# Patient Record
Sex: FEMALE | Race: WHITE | NOT HISPANIC OR LATINO | Employment: FULL TIME | URBAN - METROPOLITAN AREA
[De-identification: names, ages, dates, MRNs, and addresses within clinical notes are randomized per-mention and may not be internally consistent; named-entity substitution may affect disease eponyms.]

---

## 2018-09-06 ENCOUNTER — OFFICE VISIT (OUTPATIENT)
Dept: INTERNAL MEDICINE | Facility: CLINIC | Age: 43
End: 2018-09-06

## 2018-09-06 VITALS
SYSTOLIC BLOOD PRESSURE: 112 MMHG | HEIGHT: 64 IN | OXYGEN SATURATION: 98 % | WEIGHT: 154.9 LBS | HEART RATE: 73 BPM | DIASTOLIC BLOOD PRESSURE: 74 MMHG | TEMPERATURE: 98.5 F | BODY MASS INDEX: 26.44 KG/M2

## 2018-09-06 DIAGNOSIS — L71.9 ROSACEA: Primary | ICD-10-CM

## 2018-09-06 DIAGNOSIS — J45.20 MILD INTERMITTENT ASTHMA, UNSPECIFIED WHETHER COMPLICATED: ICD-10-CM

## 2018-09-06 DIAGNOSIS — Z00.00 HEALTHCARE MAINTENANCE: ICD-10-CM

## 2018-09-06 PROBLEM — J45.909 ASTHMA: Status: ACTIVE | Noted: 2018-09-06

## 2018-09-06 PROCEDURE — 99386 PREV VISIT NEW AGE 40-64: CPT | Performed by: FAMILY MEDICINE

## 2018-09-06 PROCEDURE — 99213 OFFICE O/P EST LOW 20 MIN: CPT | Performed by: FAMILY MEDICINE

## 2018-09-06 PROCEDURE — 90715 TDAP VACCINE 7 YRS/> IM: CPT | Performed by: FAMILY MEDICINE

## 2018-09-06 PROCEDURE — 90471 IMMUNIZATION ADMIN: CPT | Performed by: FAMILY MEDICINE

## 2018-09-06 RX ORDER — ALBUTEROL SULFATE 90 UG/1
2 AEROSOL, METERED RESPIRATORY (INHALATION) EVERY 4 HOURS PRN
Qty: 1 INHALER | Refills: 2 | Status: SHIPPED | OUTPATIENT
Start: 2018-09-06

## 2018-09-06 RX ORDER — METRONIDAZOLE 10 MG/G
GEL TOPICAL DAILY
Qty: 55 G | Refills: 2 | Status: SHIPPED | OUTPATIENT
Start: 2018-09-06 | End: 2022-09-02 | Stop reason: SDUPTHER

## 2018-09-06 NOTE — PROGRESS NOTES
Krupa Palomo is a 43 y.o. female.      Assessment/Plan   Problem List Items Addressed This Visit        Respiratory    Asthma    Relevant Medications    albuterol (PROVENTIL HFA;VENTOLIN HFA) 108 (90 Base) MCG/ACT inhaler       Musculoskeletal and Integument    Rosacea - Primary       Other    Healthcare maintenance    Relevant Orders    TSH (Completed)    T4, Free (Completed)    Comprehensive Metabolic Panel (Completed)    CBC & Differential (Completed)           follow up RESULTS of blood work for ongoing management of chronic medical problems otherwise as needed 6 months      Chief Complaint   Patient presents with   • new patient visit   • would like to have her thyroid level checked     family history - unable to lose weight   • Rosacea     Social History   Substance Use Topics   • Smoking status: Never Smoker   • Smokeless tobacco: Never Used   • Alcohol use No       History of Present Illness   Patient new to this office with chronic problems of rosacea and asthma doing fairly well post treatment plan no chest pain shortness of breath and increased fatigue should like refill on her medication  Krupa Palomo 43 y.o. female who presents for an Annual Wellness Visit.  she has a history of   Patient Active Problem List   Diagnosis   • Rosacea   • Asthma   • Healthcare maintenance   .  she has been feeling well.  Labs results discussed in detail with the patient.  Plan to update vaccines if needed today.  I  reviewed health maintenance with her as part of my preventative care plan.    Health Habits:  Dental Exam. up to date  Eye Exam. up to date  Exercise: 4 times/week.  Current exercise activities include: walking        The following portions of the patient's history were reviewed and updated as appropriate:PMHroutine: Social history , Past Medical History, Allergies, Current Medications, Active Problem List, Family History and Health Maintenance    Review of Systems   Constitutional: Negative for activity  "change, appetite change and unexpected weight change.   HENT: Negative for nosebleeds and trouble swallowing.    Eyes: Negative for pain and visual disturbance.   Respiratory: Negative for chest tightness, shortness of breath and wheezing.    Cardiovascular: Negative for chest pain and palpitations.   Gastrointestinal: Negative for abdominal pain and blood in stool.   Endocrine: Negative.    Genitourinary: Negative for difficulty urinating and hematuria.   Musculoskeletal: Negative for joint swelling.   Skin: Negative for color change and rash.   Allergic/Immunologic: Negative.    Neurological: Negative for syncope and speech difficulty.   Hematological: Negative for adenopathy.   Psychiatric/Behavioral: Negative for agitation and confusion.   All other systems reviewed and are negative.      Objective   Vitals:    09/06/18 1438   BP: 112/74   BP Location: Left arm   Patient Position: Sitting   Cuff Size: Adult   Pulse: 73   Temp: 98.5 °F (36.9 °C)   TempSrc: Oral   SpO2: 98%   Weight: 70.3 kg (154 lb 14.4 oz)   Height: 162.6 cm (64\")     Body mass index is 26.59 kg/m².  Physical Exam   Constitutional: She is oriented to person, place, and time. She appears well-developed and well-nourished. No distress.   HENT:   Head: Normocephalic and atraumatic.   Right Ear: External ear normal.   Left Ear: External ear normal.   Mouth/Throat: Oropharynx is clear and moist.   Eyes: Pupils are equal, round, and reactive to light. Conjunctivae and EOM are normal. Right eye exhibits no discharge. Left eye exhibits no discharge. No scleral icterus.   Neck: Normal range of motion. Neck supple. No tracheal deviation present. No thyromegaly present.   Cardiovascular: Normal rate, regular rhythm, normal heart sounds, intact distal pulses and normal pulses.  Exam reveals no gallop.    No murmur heard.  Pulmonary/Chest: Effort normal and breath sounds normal. No respiratory distress. She has no wheezes. She has no rales. "   Musculoskeletal: Normal range of motion.   Neurological: She is alert and oriented to person, place, and time. She exhibits normal muscle tone. Coordination normal.   Skin: Skin is warm. No rash noted. There is erythema. No pallor.   Psychiatric: She has a normal mood and affect. Her behavior is normal. Judgment and thought content normal.   Nursing note and vitals reviewed.    Reviewed Data:  Office Visit on 09/06/2018   Component Date Value Ref Range Status   • TSH 09/06/2018 1.580  0.270 - 4.200 mIU/mL Final   • Free T4 09/06/2018 1.12  0.93 - 1.70 ng/dL Final   • Glucose 09/06/2018 89  65 - 99 mg/dL Final   • BUN 09/06/2018 11  6 - 20 mg/dL Final   • Creatinine 09/06/2018 0.96  0.57 - 1.00 mg/dL Final   • eGFR Non  Am 09/06/2018 64  >60 mL/min/1.73 Final   • eGFR African Am 09/06/2018 78  >60 mL/min/1.73 Final   • BUN/Creatinine Ratio 09/06/2018 11.5  7.0 - 25.0 Final   • Sodium 09/06/2018 141  136 - 145 mmol/L Final   • Potassium 09/06/2018 4.2  3.5 - 5.2 mmol/L Final   • Chloride 09/06/2018 105  98 - 107 mmol/L Final   • Total CO2 09/06/2018 23.6  22.0 - 29.0 mmol/L Final   • Calcium 09/06/2018 8.9  8.6 - 10.5 mg/dL Final   • Total Protein 09/06/2018 7.7  6.0 - 8.5 g/dL Final   • Albumin 09/06/2018 4.90  3.50 - 5.20 g/dL Final   • Globulin 09/06/2018 2.8  gm/dL Final   • A/G Ratio 09/06/2018 1.8  g/dL Final   • Total Bilirubin 09/06/2018 0.6  0.1 - 1.2 mg/dL Final   • Alkaline Phosphatase 09/06/2018 56  39 - 117 U/L Final   • AST (SGOT) 09/06/2018 17  1 - 32 U/L Final   • ALT (SGPT) 09/06/2018 12  1 - 33 U/L Final   • WBC 09/06/2018 7.07  4.50 - 10.70 10*3/mm3 Final   • RBC 09/06/2018 4.42  3.90 - 5.20 10*6/mm3 Final   • Hemoglobin 09/06/2018 13.4  11.9 - 15.5 g/dL Final   • Hematocrit 09/06/2018 40.4  35.6 - 45.5 % Final   • MCV 09/06/2018 91.4  80.5 - 98.2 fL Final   • MCH 09/06/2018 30.3  26.9 - 32.0 pg Final   • MCHC 09/06/2018 33.2  32.4 - 36.3 g/dL Final   • RDW 09/06/2018 12.8  11.7 - 13.0 %  Final   • Platelets 09/06/2018 186  140 - 500 10*3/mm3 Final   • Neutrophil Rel % 09/06/2018 65.5  42.7 - 76.0 % Final   • Lymphocyte Rel % 09/06/2018 22.9  19.6 - 45.3 % Final   • Monocyte Rel % 09/06/2018 9.6  5.0 - 12.0 % Final   • Eosinophil Rel % 09/06/2018 1.7  0.3 - 6.2 % Final   • Basophil Rel % 09/06/2018 0.3  0.0 - 1.5 % Final   • Neutrophils Absolute 09/06/2018 4.63  1.90 - 8.10 10*3/mm3 Final   • Lymphocytes Absolute 09/06/2018 1.62  0.90 - 4.80 10*3/mm3 Final   • Monocytes Absolute 09/06/2018 0.68  0.20 - 1.20 10*3/mm3 Final   • Eosinophils Absolute 09/06/2018 0.12  0.00 - 0.70 10*3/mm3 Final   • Basophils Absolute 09/06/2018 0.02  0.00 - 0.20 10*3/mm3 Final   • Immature Granulocyte Rel % 09/06/2018 0.1  0.0 - 0.5 % Final   • Immature Grans Absolute 09/06/2018 0.01  0.00 - 0.03 10*3/mm3 Final

## 2018-09-07 ENCOUNTER — TELEPHONE (OUTPATIENT)
Dept: FAMILY MEDICINE CLINIC | Facility: CLINIC | Age: 43
End: 2018-09-07

## 2018-09-07 LAB
ALBUMIN SERPL-MCNC: 4.9 G/DL (ref 3.5–5.2)
ALBUMIN/GLOB SERPL: 1.8 G/DL
ALP SERPL-CCNC: 56 U/L (ref 39–117)
ALT SERPL-CCNC: 12 U/L (ref 1–33)
AST SERPL-CCNC: 17 U/L (ref 1–32)
BASOPHILS # BLD AUTO: 0.02 10*3/MM3 (ref 0–0.2)
BASOPHILS NFR BLD AUTO: 0.3 % (ref 0–1.5)
BILIRUB SERPL-MCNC: 0.6 MG/DL (ref 0.1–1.2)
BUN SERPL-MCNC: 11 MG/DL (ref 6–20)
BUN/CREAT SERPL: 11.5 (ref 7–25)
CALCIUM SERPL-MCNC: 8.9 MG/DL (ref 8.6–10.5)
CHLORIDE SERPL-SCNC: 105 MMOL/L (ref 98–107)
CO2 SERPL-SCNC: 23.6 MMOL/L (ref 22–29)
CREAT SERPL-MCNC: 0.96 MG/DL (ref 0.57–1)
EOSINOPHIL # BLD AUTO: 0.12 10*3/MM3 (ref 0–0.7)
EOSINOPHIL NFR BLD AUTO: 1.7 % (ref 0.3–6.2)
ERYTHROCYTE [DISTWIDTH] IN BLOOD BY AUTOMATED COUNT: 12.8 % (ref 11.7–13)
GLOBULIN SER CALC-MCNC: 2.8 GM/DL
GLUCOSE SERPL-MCNC: 89 MG/DL (ref 65–99)
HCT VFR BLD AUTO: 40.4 % (ref 35.6–45.5)
HGB BLD-MCNC: 13.4 G/DL (ref 11.9–15.5)
IMM GRANULOCYTES # BLD: 0.01 10*3/MM3 (ref 0–0.03)
IMM GRANULOCYTES NFR BLD: 0.1 % (ref 0–0.5)
LYMPHOCYTES # BLD AUTO: 1.62 10*3/MM3 (ref 0.9–4.8)
LYMPHOCYTES NFR BLD AUTO: 22.9 % (ref 19.6–45.3)
MCH RBC QN AUTO: 30.3 PG (ref 26.9–32)
MCHC RBC AUTO-ENTMCNC: 33.2 G/DL (ref 32.4–36.3)
MCV RBC AUTO: 91.4 FL (ref 80.5–98.2)
MONOCYTES # BLD AUTO: 0.68 10*3/MM3 (ref 0.2–1.2)
MONOCYTES NFR BLD AUTO: 9.6 % (ref 5–12)
NEUTROPHILS # BLD AUTO: 4.63 10*3/MM3 (ref 1.9–8.1)
NEUTROPHILS NFR BLD AUTO: 65.5 % (ref 42.7–76)
PLATELET # BLD AUTO: 186 10*3/MM3 (ref 140–500)
POTASSIUM SERPL-SCNC: 4.2 MMOL/L (ref 3.5–5.2)
PROT SERPL-MCNC: 7.7 G/DL (ref 6–8.5)
RBC # BLD AUTO: 4.42 10*6/MM3 (ref 3.9–5.2)
SODIUM SERPL-SCNC: 141 MMOL/L (ref 136–145)
T4 FREE SERPL-MCNC: 1.12 NG/DL (ref 0.93–1.7)
TSH SERPL DL<=0.005 MIU/L-ACNC: 1.58 MIU/ML (ref 0.27–4.2)
WBC # BLD AUTO: 7.07 10*3/MM3 (ref 4.5–10.7)

## 2022-01-22 PROBLEM — R05.3 POST-COVID CHRONIC COUGH: Status: ACTIVE | Noted: 2022-01-22

## 2022-01-22 PROBLEM — U09.9 POST-COVID CHRONIC COUGH: Status: ACTIVE | Noted: 2022-01-22

## 2022-09-02 ENCOUNTER — OFFICE VISIT (OUTPATIENT)
Dept: INTERNAL MEDICINE | Facility: CLINIC | Age: 47
End: 2022-09-02

## 2022-09-02 VITALS
HEART RATE: 74 BPM | HEIGHT: 55 IN | TEMPERATURE: 97.8 F | BODY MASS INDEX: 36.33 KG/M2 | DIASTOLIC BLOOD PRESSURE: 82 MMHG | WEIGHT: 157 LBS | SYSTOLIC BLOOD PRESSURE: 122 MMHG | OXYGEN SATURATION: 99 %

## 2022-09-02 DIAGNOSIS — J45.20 MILD INTERMITTENT ASTHMA, UNSPECIFIED WHETHER COMPLICATED: Primary | ICD-10-CM

## 2022-09-02 DIAGNOSIS — J04.0 LARYNGITIS: ICD-10-CM

## 2022-09-02 PROCEDURE — 99213 OFFICE O/P EST LOW 20 MIN: CPT | Performed by: NURSE PRACTITIONER

## 2022-09-02 RX ORDER — FLUOXETINE 10 MG/1
10 CAPSULE ORAL DAILY
COMMUNITY
Start: 2022-08-09 | End: 2023-01-03

## 2022-09-02 RX ORDER — PREDNISONE 10 MG/1
30 TABLET ORAL DAILY
Qty: 15 TABLET | Refills: 0 | Status: SHIPPED | OUTPATIENT
Start: 2022-09-02 | End: 2022-09-07

## 2022-09-02 NOTE — PROGRESS NOTES
"Chief Complaint  Laryngitis (Sx started 3 wks ago )    Subjective        Krupa Palomo presents to [facility]  History of Present Illness    Hx of Chronic laryngitis. This episode has been for 3 weeks. 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 She has no SOB/trouble swallowing/choking episodes. Hx of Asthma.   Covid in 01/2022. Denies any other URI symptoms.     Objective   Vital Signs:  /82   Pulse 74   Temp 97.8 °F (36.6 °C)   Ht 97.8 cm (38.5\")   Wt 71.2 kg (157 lb)   SpO2 99%   BMI 74.45 kg/m²   Estimated body mass index is 74.45 kg/m² as calculated from the following:    Height as of this encounter: 97.8 cm (38.5\").    Weight as of this encounter: 71.2 kg (157 lb).          Physical Exam  Vitals and nursing note reviewed.   Constitutional:       Appearance: Normal appearance.   HENT:      Head: Normocephalic.      Nose: Nose normal.      Mouth/Throat:      Mouth: Mucous membranes are moist.      Pharynx: No oropharyngeal exudate or posterior oropharyngeal erythema.      Comments: Lost voice x 3 weeks.   Eyes:      Pupils: Pupils are equal, round, and reactive to light.   Cardiovascular:      Rate and Rhythm: Normal rate and regular rhythm.      Pulses: Normal pulses.      Heart sounds: Normal heart sounds.      Comments: No peripheral edema noted.   Pulmonary:      Effort: Pulmonary effort is normal. No respiratory distress.      Breath sounds: Normal breath sounds. No stridor. No wheezing, rhonchi or rales.   Chest:      Chest wall: No tenderness.   Musculoskeletal:         General: Normal range of motion.   Skin:     General: Skin is warm.      Capillary Refill: Capillary refill takes less than 2 seconds.   Neurological:      Mental Status: She is alert and oriented to person, place, and time.   Psychiatric:         Behavior: Behavior normal.        Result Review :{Labs  Result Review  Imaging  Med Tab  Media  Procedures  :23}                  Assessment and " Plan   Diagnoses and all orders for this visit:    1. Mild intermittent asthma, unspecified whether complicated (Primary)  -     Ambulatory Referral to ENT (Otolaryngology)    2. Laryngitis  -     Ambulatory Referral to ENT (Otolaryngology)    Other orders  -     predniSONE (DELTASONE) 10 MG tablet; Take 3 tablets by mouth Daily for 5 days.  Dispense: 15 tablet; Refill: 0      Patient will stop at pharmacy and take her prednisone as directed.   If she develops any SOB or difficulty swallowing she will go to the ER.   Mucinex as needed for any congestion.   Referral placed to ENT.          Follow Up   No follow-ups on file.  Patient was given instructions and counseling regarding her condition or for health maintenance advice. Please see specific information pulled into the AVS if appropriate.

## 2023-01-03 ENCOUNTER — OFFICE VISIT (OUTPATIENT)
Dept: INTERNAL MEDICINE | Facility: CLINIC | Age: 48
End: 2023-01-03
Payer: COMMERCIAL

## 2023-01-03 ENCOUNTER — PATIENT ROUNDING (BHMG ONLY) (OUTPATIENT)
Dept: INTERNAL MEDICINE | Facility: CLINIC | Age: 48
End: 2023-01-03
Payer: COMMERCIAL

## 2023-01-03 VITALS
TEMPERATURE: 98 F | SYSTOLIC BLOOD PRESSURE: 110 MMHG | HEART RATE: 76 BPM | WEIGHT: 159.5 LBS | HEIGHT: 55 IN | BODY MASS INDEX: 36.91 KG/M2 | OXYGEN SATURATION: 100 % | DIASTOLIC BLOOD PRESSURE: 70 MMHG

## 2023-01-03 DIAGNOSIS — Z00.00 HEALTH CARE MAINTENANCE: Primary | ICD-10-CM

## 2023-01-03 DIAGNOSIS — Z21 HIV ANTIBODY POSITIVE: ICD-10-CM

## 2023-01-03 DIAGNOSIS — Z12.11 COLON CANCER SCREENING: ICD-10-CM

## 2023-01-03 DIAGNOSIS — J45.20 MILD INTERMITTENT ASTHMA, UNSPECIFIED WHETHER COMPLICATED: ICD-10-CM

## 2023-01-03 PROBLEM — L30.0 NUMMULAR ECZEMA: Status: ACTIVE | Noted: 2023-01-03

## 2023-01-03 PROCEDURE — 99396 PREV VISIT EST AGE 40-64: CPT | Performed by: NURSE PRACTITIONER

## 2023-01-03 NOTE — PROGRESS NOTES
Subjective   Krupa Palomo is a 47 y.o. female.     History of Present Illness   The patient is here today for CPE and lab work F/U. She is feeling well overall. Last CPE several years ago. She had a CPE at the [facility].   She exercises 5-6 days a week, running. She eats pretty healthy.     Heart murmur- dx in early 20s, tricuspid valve regurg, no issues, had an ECHO  Asthma- uses rescue maybe once a month  Eczema- UTD with derm, will sometimes use triamcinolone and eucrisa.     G-2 P-2   Pre-eclampsia with first pregnancy.     This summer donated blood and got a letter that showed antibodies for HIV. A further screen showed she did not have HIV. She has only had one sexual partner, has never used IV drugs. She has not had any further work up on this.     Had COVID Jan 2022.     C/o irritability with 2nd half of her cycle. She did try the prozac with out much relief. She is limiting caffeine that part of the month. She did start taking provitalize about 3 weeks ago. Probiotics.     , dtrs: 18 and 13, 2 foster dtrs, 17 and 13. They are planning on adopting.   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   The following portions of the patient's history were reviewed and updated as appropriate: allergies, current medications, past family history, past medical history, past social history, past surgical history and problem list.    Review of Systems   Constitutional: Negative for chills, fatigue and fever.   HENT: Negative for ear pain, rhinorrhea and sore throat.    Eyes: Negative.    Respiratory: Negative for cough and shortness of breath.    Cardiovascular: Negative.    Gastrointestinal: Negative.    Endocrine: Negative for cold intolerance and heat intolerance.   Genitourinary: Negative for breast discharge, breast lump, breast pain, difficulty urinating, dyspareunia, dysuria, flank pain, frequency, genital sores, hematuria, pelvic pain and urgency.   Musculoskeletal: Negative.    Skin: Positive for dry skin (eczema,  ankles and elbows).   Allergic/Immunologic: Negative for environmental allergies and food allergies.   Neurological: Negative.    Hematological: Negative.    Psychiatric/Behavioral: Negative for dysphoric mood and suicidal ideas. The patient is not nervous/anxious.        Objective   Physical Exam  Constitutional:       Appearance: Normal appearance. She is well-developed.      Comments: Body mass index is 75.64 kg/m².     HENT:      Right Ear: Hearing, tympanic membrane, ear canal and external ear normal.      Left Ear: Hearing, tympanic membrane, ear canal and external ear normal.   Eyes:      General: Lids are normal.      Conjunctiva/sclera: Conjunctivae normal.      Pupils: Pupils are equal, round, and reactive to light.   Neck:      Thyroid: No thyroid mass or thyromegaly.      Vascular: No carotid bruit.      Trachea: Trachea normal.   Cardiovascular:      Rate and Rhythm: Normal rate and regular rhythm.      Pulses: Normal pulses.      Heart sounds: Normal heart sounds.   Pulmonary:      Effort: Pulmonary effort is normal.      Breath sounds: Normal breath sounds.   Abdominal:      General: Bowel sounds are normal.      Palpations: Abdomen is soft.      Tenderness: There is no abdominal tenderness.   Musculoskeletal:         General: Normal range of motion.      Cervical back: Normal range of motion.   Lymphadenopathy:      Cervical: No cervical adenopathy.      Upper Body:      Right upper body: No supraclavicular adenopathy.      Left upper body: No supraclavicular adenopathy.      Lower Body: No right inguinal adenopathy. No left inguinal adenopathy.   Skin:     General: Skin is warm and dry.   Neurological:      Mental Status: She is alert and oriented to person, place, and time.      GCS: GCS eye subscore is 4. GCS verbal subscore is 5. GCS motor subscore is 6.      Cranial Nerves: No cranial nerve deficit.      Sensory: No sensory deficit.      Deep Tendon Reflexes:      Reflex Scores:       Patellar  reflexes are 2+ on the right side and 2+ on the left side.  Psychiatric:         Speech: Speech normal.         Behavior: Behavior normal. Behavior is cooperative.         Thought Content: Thought content normal.         Judgment: Judgment normal.         Vitals:    01/03/23 0933   BP: 110/70   Pulse:    Temp:    SpO2:      Body mass index is 75.64 kg/m².      Assessment & Plan   Diagnoses and all orders for this visit:    1. Health care maintenance (Primary)  -     CBC & Differential  -     Comprehensive Metabolic Panel  -     Lipid Panel With LDL / HDL Ratio  -     TSH Rfx On Abnormal To Free T4  -     Hepatitis C Antibody  -     HIV-1 / O / 2 Ag / Antibody 4th Generation    2. Colon cancer screening  -     Ambulatory Referral For Screening Colonoscopy    3. HIV antibody positive (HCC)  -     CBC & Differential  -     Comprehensive Metabolic Panel  -     Lipid Panel With LDL / HDL Ratio  -     TSH Rfx On Abnormal To Free T4  -     Hepatitis C Antibody  -     HIV-1 / O / 2 Ag / Antibody 4th Generation    4. Mild intermittent asthma, unspecified whether complicated  -     CBC & Differential  -     Comprehensive Metabolic Panel  -     Lipid Panel With LDL / HDL Ratio  -     TSH Rfx On Abnormal To Free T4  -     Hepatitis C Antibody  -     HIV-1 / O / 2 Ag / Antibody 4th Generation                 1. Preventative counseling- continue to eat healthy and exercise   2. Abnormal blood screening- check HIV test today     GYN- UTD  Discussed vaccines

## 2023-01-03 NOTE — PROGRESS NOTES
Patient rounding obtained at checkout, patient stated she was referred to us by a friend and she did not have any problems making her new patient appointment. Her first visit went excellent and she would 100% refer friends and family to us.

## 2023-01-04 LAB
ALBUMIN SERPL-MCNC: 4.3 G/DL (ref 3.5–5.2)
ALBUMIN/GLOB SERPL: 1.4 G/DL
ALP SERPL-CCNC: 53 U/L (ref 39–117)
ALT SERPL-CCNC: 14 U/L (ref 1–33)
AST SERPL-CCNC: 23 U/L (ref 1–32)
BASOPHILS # BLD AUTO: 0.04 10*3/MM3 (ref 0–0.2)
BASOPHILS NFR BLD AUTO: 0.7 % (ref 0–1.5)
BILIRUB SERPL-MCNC: 0.5 MG/DL (ref 0–1.2)
BUN SERPL-MCNC: 10 MG/DL (ref 6–20)
BUN/CREAT SERPL: 14.5 (ref 7–25)
CALCIUM SERPL-MCNC: 8.6 MG/DL (ref 8.6–10.5)
CHLORIDE SERPL-SCNC: 107 MMOL/L (ref 98–107)
CHOLEST SERPL-MCNC: 172 MG/DL (ref 0–200)
CO2 SERPL-SCNC: 21.3 MMOL/L (ref 22–29)
CREAT SERPL-MCNC: 0.69 MG/DL (ref 0.57–1)
EGFRCR SERPLBLD CKD-EPI 2021: 108.5 ML/MIN/1.73
EOSINOPHIL # BLD AUTO: 0.06 10*3/MM3 (ref 0–0.4)
EOSINOPHIL NFR BLD AUTO: 1.1 % (ref 0.3–6.2)
ERYTHROCYTE [DISTWIDTH] IN BLOOD BY AUTOMATED COUNT: 14 % (ref 12.3–15.4)
GLOBULIN SER CALC-MCNC: 3 GM/DL
GLUCOSE SERPL-MCNC: 91 MG/DL (ref 65–99)
HCT VFR BLD AUTO: 38.6 % (ref 34–46.6)
HCV AB S/CO SERPL IA: 0.2 S/CO RATIO (ref 0–0.9)
HDLC SERPL-MCNC: 77 MG/DL (ref 40–60)
HGB BLD-MCNC: 12.2 G/DL (ref 12–15.9)
HIV 1+2 AB+HIV1 P24 AG SERPL QL IA: NON REACTIVE
IMM GRANULOCYTES # BLD AUTO: 0.02 10*3/MM3 (ref 0–0.05)
IMM GRANULOCYTES NFR BLD AUTO: 0.4 % (ref 0–0.5)
LDLC SERPL CALC-MCNC: 78 MG/DL (ref 0–100)
LDLC/HDLC SERPL: 0.99 {RATIO}
LYMPHOCYTES # BLD AUTO: 0.85 10*3/MM3 (ref 0.7–3.1)
LYMPHOCYTES NFR BLD AUTO: 14.9 % (ref 19.6–45.3)
MCH RBC QN AUTO: 26.6 PG (ref 26.6–33)
MCHC RBC AUTO-ENTMCNC: 31.6 G/DL (ref 31.5–35.7)
MCV RBC AUTO: 84.1 FL (ref 79–97)
MONOCYTES # BLD AUTO: 0.46 10*3/MM3 (ref 0.1–0.9)
MONOCYTES NFR BLD AUTO: 8.1 % (ref 5–12)
NEUTROPHILS # BLD AUTO: 4.27 10*3/MM3 (ref 1.7–7)
NEUTROPHILS NFR BLD AUTO: 74.8 % (ref 42.7–76)
NRBC BLD AUTO-RTO: 0 /100 WBC (ref 0–0.2)
PLATELET # BLD AUTO: 219 10*3/MM3 (ref 140–450)
POTASSIUM SERPL-SCNC: 4.1 MMOL/L (ref 3.5–5.2)
PROT SERPL-MCNC: 7.3 G/DL (ref 6–8.5)
RBC # BLD AUTO: 4.59 10*6/MM3 (ref 3.77–5.28)
SODIUM SERPL-SCNC: 137 MMOL/L (ref 136–145)
TRIGL SERPL-MCNC: 92 MG/DL (ref 0–150)
TSH SERPL DL<=0.005 MIU/L-ACNC: 1.97 UIU/ML (ref 0.27–4.2)
VLDLC SERPL CALC-MCNC: 17 MG/DL (ref 5–40)
WBC # BLD AUTO: 5.7 10*3/MM3 (ref 3.4–10.8)

## 2023-02-28 ENCOUNTER — PREP FOR SURGERY (OUTPATIENT)
Dept: SURGERY | Facility: SURGERY CENTER | Age: 48
End: 2023-02-28
Payer: COMMERCIAL

## 2023-02-28 DIAGNOSIS — Z12.11 ENCOUNTER FOR SCREENING FOR MALIGNANT NEOPLASM OF COLON: Primary | ICD-10-CM

## 2023-02-28 RX ORDER — SODIUM CHLORIDE, SODIUM LACTATE, POTASSIUM CHLORIDE, CALCIUM CHLORIDE 600; 310; 30; 20 MG/100ML; MG/100ML; MG/100ML; MG/100ML
30 INJECTION, SOLUTION INTRAVENOUS CONTINUOUS PRN
OUTPATIENT
Start: 2023-02-28

## 2023-03-27 ENCOUNTER — TELEPHONE (OUTPATIENT)
Dept: GASTROENTEROLOGY | Facility: CLINIC | Age: 48
End: 2023-03-27
Payer: COMMERCIAL

## 2023-03-27 DIAGNOSIS — Z12.11 COLON CANCER SCREENING: Primary | ICD-10-CM

## 2023-09-01 ENCOUNTER — OFFICE VISIT (OUTPATIENT)
Dept: INTERNAL MEDICINE | Facility: CLINIC | Age: 48
End: 2023-09-01
Payer: COMMERCIAL

## 2023-09-01 VITALS
SYSTOLIC BLOOD PRESSURE: 100 MMHG | HEART RATE: 78 BPM | DIASTOLIC BLOOD PRESSURE: 66 MMHG | TEMPERATURE: 98.5 F | OXYGEN SATURATION: 98 % | WEIGHT: 162.7 LBS | HEIGHT: 64 IN | BODY MASS INDEX: 27.78 KG/M2

## 2023-09-01 DIAGNOSIS — H69.82 DYSFUNCTION OF LEFT EUSTACHIAN TUBE: Primary | ICD-10-CM

## 2023-09-01 DIAGNOSIS — R53.83 OTHER FATIGUE: ICD-10-CM

## 2023-09-01 PROCEDURE — 99213 OFFICE O/P EST LOW 20 MIN: CPT | Performed by: NURSE PRACTITIONER

## 2023-09-01 RX ORDER — METHYLPREDNISOLONE 4 MG/1
TABLET ORAL
Qty: 21 TABLET | Refills: 0 | Status: SHIPPED | OUTPATIENT
Start: 2023-09-01

## 2023-09-01 NOTE — PROGRESS NOTES
Subjective   Krupa Palomo is a 47 y.o. female. Patient is here today for   Chief Complaint   Patient presents with    Follow-up     Scalp pain     .    History of Present Illness   Patient was seen in the urgent care last week. Pain started on scalp and radiated to her left ear. Pain has improved.   Yesterday she had a headache and in general wasn't feeling well. No rash. She has taken advil and sudafed as needed.     The following portions of the patient's history were reviewed and updated as appropriate: allergies, current medications, past family history, past medical history, past social history, past surgical history and problem list.    Review of Systems    Objective   Vitals:    09/01/23 0803   BP: 100/66   Pulse: 78   Temp: 98.5 øF (36.9 øC)   SpO2: 98%     Body mass index is 27.93 kg/mý.  Physical Exam  Vitals and nursing note reviewed.   Constitutional:       Appearance: Normal appearance. She is well-developed.   HENT:      Right Ear: Ear canal normal. A middle ear effusion is present.      Left Ear: Ear canal normal. A middle ear effusion is present.      Mouth/Throat:      Pharynx: Oropharynx is clear.   Cardiovascular:      Rate and Rhythm: Normal rate and regular rhythm.      Heart sounds: Normal heart sounds.   Pulmonary:      Effort: Pulmonary effort is normal.      Breath sounds: Normal breath sounds.   Skin:     General: Skin is warm and dry.      Comments: No lesions seen on scalp   Neurological:      General: No focal deficit present.      Mental Status: She is alert and oriented to person, place, and time.   Psychiatric:         Speech: Speech normal.         Behavior: Behavior normal.         Thought Content: Thought content normal.       Assessment & Plan   Diagnoses and all orders for this visit:    1. Dysfunction of left eustachian tube (Primary)  -     methylPREDNISolone (Medrol) 4 MG dose pack; follow package directions  Dispense: 21 tablet; Refill: 0    2. Other fatigue  -     CBC &  Differential      We will check a CBC today due to patient's fatigue.  Will treat with Medrol Dosepak to help with the pain on her scalp.  She was prescribed gabapentin but has not tried it yet because of the possible side effects.  We will call patient with CBC results.  Discussed possible progression to Bell's palsy.  Patient is aware of that diagnosis, but has not had any symptoms.  Follow up if symptoms do not improve

## 2023-09-02 LAB
BASOPHILS # BLD AUTO: 0.04 10*3/MM3 (ref 0–0.2)
BASOPHILS NFR BLD AUTO: 0.7 % (ref 0–1.5)
EOSINOPHIL # BLD AUTO: 0.2 10*3/MM3 (ref 0–0.4)
EOSINOPHIL NFR BLD AUTO: 3.5 % (ref 0.3–6.2)
ERYTHROCYTE [DISTWIDTH] IN BLOOD BY AUTOMATED COUNT: 13 % (ref 12.3–15.4)
HCT VFR BLD AUTO: 40.2 % (ref 34–46.6)
HGB BLD-MCNC: 13.5 G/DL (ref 12–15.9)
IMM GRANULOCYTES # BLD AUTO: 0.01 10*3/MM3 (ref 0–0.05)
IMM GRANULOCYTES NFR BLD AUTO: 0.2 % (ref 0–0.5)
LYMPHOCYTES # BLD AUTO: 1.05 10*3/MM3 (ref 0.7–3.1)
LYMPHOCYTES NFR BLD AUTO: 18.4 % (ref 19.6–45.3)
MCH RBC QN AUTO: 29.3 PG (ref 26.6–33)
MCHC RBC AUTO-ENTMCNC: 33.6 G/DL (ref 31.5–35.7)
MCV RBC AUTO: 87.4 FL (ref 79–97)
MONOCYTES # BLD AUTO: 0.51 10*3/MM3 (ref 0.1–0.9)
MONOCYTES NFR BLD AUTO: 8.9 % (ref 5–12)
NEUTROPHILS # BLD AUTO: 3.89 10*3/MM3 (ref 1.7–7)
NEUTROPHILS NFR BLD AUTO: 68.3 % (ref 42.7–76)
NRBC BLD AUTO-RTO: 0 /100 WBC (ref 0–0.2)
PLATELET # BLD AUTO: 213 10*3/MM3 (ref 140–450)
RBC # BLD AUTO: 4.6 10*6/MM3 (ref 3.77–5.28)
WBC # BLD AUTO: 5.7 10*3/MM3 (ref 3.4–10.8)

## 2023-10-09 ENCOUNTER — OFFICE VISIT (OUTPATIENT)
Dept: INTERNAL MEDICINE | Facility: CLINIC | Age: 48
End: 2023-10-09
Payer: COMMERCIAL

## 2023-10-09 VITALS
HEART RATE: 71 BPM | HEIGHT: 64 IN | SYSTOLIC BLOOD PRESSURE: 108 MMHG | WEIGHT: 162 LBS | DIASTOLIC BLOOD PRESSURE: 80 MMHG | TEMPERATURE: 98 F | OXYGEN SATURATION: 99 % | BODY MASS INDEX: 27.66 KG/M2

## 2023-10-09 DIAGNOSIS — J40 BRONCHITIS: Primary | ICD-10-CM

## 2023-10-09 PROCEDURE — 99213 OFFICE O/P EST LOW 20 MIN: CPT | Performed by: NURSE PRACTITIONER

## 2023-10-09 RX ORDER — AZITHROMYCIN 250 MG/1
TABLET, FILM COATED ORAL
Qty: 6 TABLET | Refills: 0 | Status: SHIPPED | OUTPATIENT
Start: 2023-10-09

## 2023-12-21 ENCOUNTER — TELEPHONE (OUTPATIENT)
Dept: INTERNAL MEDICINE | Facility: CLINIC | Age: 48
End: 2023-12-21
Payer: COMMERCIAL

## 2023-12-21 RX ORDER — TRIAMCINOLONE ACETONIDE 1 MG/G
1 CREAM TOPICAL 2 TIMES DAILY
Qty: 28.4 G | Refills: 1 | Status: SHIPPED | OUTPATIENT
Start: 2023-12-21

## 2023-12-21 NOTE — TELEPHONE ENCOUNTER
----- Message from PAUL Glover sent at 12/20/2023  4:31 PM EST -----  Regarding: FW: triamcinolone acetonide cream  Contact: [phone]  Ok to send in triamcinolone cream .1% BID  30 gm tube with 1 refill   ----- Message -----  From: Lenka Tavares MA  Sent: 12/20/2023   7:15 AM EST  To: PAUL Glover  Subject: FW: triamcinolone acetonide cream                  ----- Message -----  From: Krupa Palomo  Sent: 12/19/2023   7:30 PM EST  To: [facility] [place] [id number] [facility] [place]  Subject: triamcinolone acetonide cream                    Wondering if you would be willing to prescribe triamcinolone acetonide cream for eczema on my hands and ankles.  I know you have not prescribed this for me before, but I believe the eczema is in my chart.  I have previously had a dermatologist prescribe it  [phone]

## 2024-01-04 ENCOUNTER — LAB (OUTPATIENT)
Dept: INTERNAL MEDICINE | Facility: CLINIC | Age: 49
End: 2024-01-04
Payer: COMMERCIAL

## 2024-01-04 DIAGNOSIS — Z00.00 HEALTH CARE MAINTENANCE: Primary | ICD-10-CM

## 2024-01-05 LAB
ALBUMIN SERPL-MCNC: 4.3 G/DL (ref 3.5–5.2)
ALBUMIN/GLOB SERPL: 1.5 G/DL
ALP SERPL-CCNC: 60 U/L (ref 39–117)
ALT SERPL-CCNC: 13 U/L (ref 1–33)
AST SERPL-CCNC: 16 U/L (ref 1–32)
BASOPHILS # BLD AUTO: 0.04 10*3/MM3 (ref 0–0.2)
BASOPHILS NFR BLD AUTO: 0.6 % (ref 0–1.5)
BILIRUB SERPL-MCNC: 0.4 MG/DL (ref 0–1.2)
BUN SERPL-MCNC: 9 MG/DL (ref 6–20)
BUN/CREAT SERPL: 10.8 (ref 7–25)
CALCIUM SERPL-MCNC: 9.3 MG/DL (ref 8.6–10.5)
CHLORIDE SERPL-SCNC: 106 MMOL/L (ref 98–107)
CHOLEST SERPL-MCNC: 180 MG/DL (ref 0–200)
CHOLEST/HDLC SERPL: 2.5 {RATIO}
CO2 SERPL-SCNC: 22.5 MMOL/L (ref 22–29)
CREAT SERPL-MCNC: 0.83 MG/DL (ref 0.57–1)
EGFRCR SERPLBLD CKD-EPI 2021: 87.6 ML/MIN/1.73
EOSINOPHIL # BLD AUTO: 0.26 10*3/MM3 (ref 0–0.4)
EOSINOPHIL NFR BLD AUTO: 3.8 % (ref 0.3–6.2)
ERYTHROCYTE [DISTWIDTH] IN BLOOD BY AUTOMATED COUNT: 12.3 % (ref 12.3–15.4)
GLOBULIN SER CALC-MCNC: 2.9 GM/DL
GLUCOSE SERPL-MCNC: 92 MG/DL (ref 65–99)
HCT VFR BLD AUTO: 40.3 % (ref 34–46.6)
HDLC SERPL-MCNC: 72 MG/DL (ref 40–60)
HGB BLD-MCNC: 13.5 G/DL (ref 12–15.9)
IMM GRANULOCYTES # BLD AUTO: 0.01 10*3/MM3 (ref 0–0.05)
IMM GRANULOCYTES NFR BLD AUTO: 0.1 % (ref 0–0.5)
LDLC SERPL CALC-MCNC: 95 MG/DL (ref 0–100)
LYMPHOCYTES # BLD AUTO: 0.92 10*3/MM3 (ref 0.7–3.1)
LYMPHOCYTES NFR BLD AUTO: 13.4 % (ref 19.6–45.3)
MCH RBC QN AUTO: 29.8 PG (ref 26.6–33)
MCHC RBC AUTO-ENTMCNC: 33.5 G/DL (ref 31.5–35.7)
MCV RBC AUTO: 89 FL (ref 79–97)
MONOCYTES # BLD AUTO: 0.78 10*3/MM3 (ref 0.1–0.9)
MONOCYTES NFR BLD AUTO: 11.4 % (ref 5–12)
NEUTROPHILS # BLD AUTO: 4.86 10*3/MM3 (ref 1.7–7)
NEUTROPHILS NFR BLD AUTO: 70.7 % (ref 42.7–76)
NRBC BLD AUTO-RTO: 0 /100 WBC (ref 0–0.2)
PLATELET # BLD AUTO: 198 10*3/MM3 (ref 140–450)
POTASSIUM SERPL-SCNC: 4.6 MMOL/L (ref 3.5–5.2)
PROT SERPL-MCNC: 7.2 G/DL (ref 6–8.5)
RBC # BLD AUTO: 4.53 10*6/MM3 (ref 3.77–5.28)
SODIUM SERPL-SCNC: 139 MMOL/L (ref 136–145)
TRIGL SERPL-MCNC: 70 MG/DL (ref 0–150)
TSH SERPL DL<=0.005 MIU/L-ACNC: 2.32 UIU/ML (ref 0.27–4.2)
VLDLC SERPL CALC-MCNC: 13 MG/DL (ref 5–40)
WBC # BLD AUTO: 6.87 10*3/MM3 (ref 3.4–10.8)

## 2024-01-08 ENCOUNTER — OFFICE VISIT (OUTPATIENT)
Dept: INTERNAL MEDICINE | Facility: CLINIC | Age: 49
End: 2024-01-08
Payer: COMMERCIAL

## 2024-01-08 VITALS
HEART RATE: 109 BPM | HEIGHT: 64 IN | SYSTOLIC BLOOD PRESSURE: 104 MMHG | WEIGHT: 167 LBS | DIASTOLIC BLOOD PRESSURE: 70 MMHG | BODY MASS INDEX: 28.51 KG/M2 | OXYGEN SATURATION: 99 %

## 2024-01-08 DIAGNOSIS — Z00.00 HEALTH CARE MAINTENANCE: Primary | ICD-10-CM

## 2024-01-08 DIAGNOSIS — J45.20 MILD INTERMITTENT ASTHMA, UNSPECIFIED WHETHER COMPLICATED: ICD-10-CM

## 2024-01-08 PROCEDURE — 99396 PREV VISIT EST AGE 40-64: CPT | Performed by: NURSE PRACTITIONER

## 2024-01-08 RX ORDER — ALBUTEROL SULFATE 90 UG/1
2 AEROSOL, METERED RESPIRATORY (INHALATION) EVERY 4 HOURS PRN
Qty: 18 G | Refills: 1 | Status: SHIPPED | OUTPATIENT
Start: 2024-01-08

## 2024-01-08 NOTE — PROGRESS NOTES
Subjective   Krupa Palomo is a 48 y.o. female.     History of Present Illness   The patient is here today for CPE and lab work F/U. Feeling well. Eating well and exercising.     BMI is >= 25 and <30. (Overweight) The following options were offered after discussion;: exercise counseling/recommendations and nutrition counseling/recommendations     Asthma- intermittent issues with URIs, typically well controlled.   Heart murmur- dx in early 20s, tricuspid valve regurg, no issues, had an ECHO   G-2 P-2   Pre-eclampsia with first pregnancy.     , dtrs: 19 and 14, 2 foster dtrs, 18 and 14. They are planning on adopting, the 14 yr old, 18 yr old was adopted.    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   The following portions of the patient's history were reviewed and updated as appropriate: allergies, current medications, past family history, past medical history, past social history, past surgical history and problem list.    Review of Systems   Constitutional:  Negative for chills, fatigue and fever.   HENT:  Positive for congestion. Negative for ear discharge (fluid in left ear), ear pain, rhinorrhea and sore throat.    Eyes: Negative.    Respiratory:  Negative for cough and shortness of breath.    Cardiovascular: Negative.    Gastrointestinal: Negative.    Endocrine: Negative for cold intolerance and heat intolerance.   Genitourinary:  Negative for breast discharge, breast lump, breast pain, difficulty urinating, dyspareunia, dysuria, flank pain, frequency, genital sores, hematuria, pelvic pain and urgency.   Musculoskeletal: Negative.    Skin:  Positive for dry skin (eczema).   Allergic/Immunologic: Negative for environmental allergies and food allergies.   Neurological: Negative.    Hematological: Negative.    Psychiatric/Behavioral:  Negative for dysphoric mood and suicidal ideas. The patient is not nervous/anxious.        Objective   Physical Exam  Constitutional:       Appearance: Normal appearance. She is  well-developed.      Comments: Body mass index is 28.65 kg/m².     HENT:      Right Ear: Hearing, tympanic membrane, ear canal and external ear normal.      Left Ear: Hearing, tympanic membrane, ear canal and external ear normal.   Eyes:      General: Lids are normal.      Conjunctiva/sclera: Conjunctivae normal.      Pupils: Pupils are equal, round, and reactive to light.   Neck:      Thyroid: No thyroid mass or thyromegaly.      Vascular: No carotid bruit.      Trachea: Trachea normal.   Cardiovascular:      Rate and Rhythm: Normal rate and regular rhythm.      Pulses: Normal pulses.      Heart sounds: Normal heart sounds.   Pulmonary:      Effort: Pulmonary effort is normal.      Breath sounds: Normal breath sounds.   Abdominal:      General: Bowel sounds are normal.      Palpations: Abdomen is soft.      Tenderness: There is no abdominal tenderness.   Musculoskeletal:         General: Normal range of motion.      Cervical back: Normal range of motion.   Lymphadenopathy:      Cervical: No cervical adenopathy.      Upper Body:      Right upper body: No supraclavicular adenopathy.      Left upper body: No supraclavicular adenopathy.      Lower Body: No right inguinal adenopathy. No left inguinal adenopathy.   Skin:     General: Skin is warm and dry.   Neurological:      Mental Status: She is alert and oriented to person, place, and time.      GCS: GCS eye subscore is 4. GCS verbal subscore is 5. GCS motor subscore is 6.      Cranial Nerves: No cranial nerve deficit.      Sensory: No sensory deficit.      Deep Tendon Reflexes:      Reflex Scores:       Patellar reflexes are 2+ on the right side and 2+ on the left side.  Psychiatric:         Speech: Speech normal.         Behavior: Behavior normal. Behavior is cooperative.         Thought Content: Thought content normal.         Judgment: Judgment normal.         Vitals:    01/08/24 1412   BP: 104/70   Pulse: 109   SpO2: 99%     Body mass index is 28.65  kg/m².      Current Outpatient Medications:     albuterol sulfate  (90 Base) MCG/ACT inhaler, Inhale 2 puffs Every 4 (Four) Hours As Needed for Wheezing., Disp: 18 g, Rfl: 1    triamcinolone (KENALOG) 0.1 % cream, Apply 1 application  topically to the appropriate area as directed 2 (Two) Times a Day., Disp: 28.4 g, Rfl: 1   Lab on 01/04/2024   Component Date Value Ref Range Status    WBC 01/04/2024 6.87  3.40 - 10.80 10*3/mm3 Final    RBC 01/04/2024 4.53  3.77 - 5.28 10*6/mm3 Final    Hemoglobin 01/04/2024 13.5  12.0 - 15.9 g/dL Final    Hematocrit 01/04/2024 40.3  34.0 - 46.6 % Final    MCV 01/04/2024 89.0  79.0 - 97.0 fL Final    MCH 01/04/2024 29.8  26.6 - 33.0 pg Final    MCHC 01/04/2024 33.5  31.5 - 35.7 g/dL Final    RDW 01/04/2024 12.3  12.3 - 15.4 % Final    Platelets 01/04/2024 198  140 - 450 10*3/mm3 Final    Neutrophil Rel % 01/04/2024 70.7  42.7 - 76.0 % Final    Lymphocyte Rel % 01/04/2024 13.4 (L)  19.6 - 45.3 % Final    Monocyte Rel % 01/04/2024 11.4  5.0 - 12.0 % Final    Eosinophil Rel % 01/04/2024 3.8  0.3 - 6.2 % Final    Basophil Rel % 01/04/2024 0.6  0.0 - 1.5 % Final    Neutrophils Absolute 01/04/2024 4.86  1.70 - 7.00 10*3/mm3 Final    Lymphocytes Absolute 01/04/2024 0.92  0.70 - 3.10 10*3/mm3 Final    Monocytes Absolute 01/04/2024 0.78  0.10 - 0.90 10*3/mm3 Final    Eosinophils Absolute 01/04/2024 0.26  0.00 - 0.40 10*3/mm3 Final    Basophils Absolute 01/04/2024 0.04  0.00 - 0.20 10*3/mm3 Final    Immature Granulocyte Rel % 01/04/2024 0.1  0.0 - 0.5 % Final    Immature Grans Absolute 01/04/2024 0.01  0.00 - 0.05 10*3/mm3 Final    nRBC 01/04/2024 0.0  0.0 - 0.2 /100 WBC Final    TSH 01/04/2024 2.320  0.270 - 4.200 uIU/mL Final    Total Cholesterol 01/04/2024 180  0 - 200 mg/dL Final    Comment: Cholesterol Reference Ranges  (U.S. Department of Health and Human Services ATP III  Classifications)  Desirable          <200 mg/dL  Borderline High    200-239 mg/dL  High Risk          >240  mg/dL  Triglyceride Reference Ranges  (U.S. Department of Health and Human Services ATP III  Classifications)  Normal           <150 mg/dL  Borderline High  150-199 mg/dL  High             200-499 mg/dL  Very High        >500 mg/dL  HDL Reference Ranges  (U.S. Department of Health and Human Services ATP III  Classifications)  Low     <40 mg/dl (major risk factor for CHD)  High    >60 mg/dl ('negative' risk factor for CHD)  LDL Reference Ranges  (U.S. Department of Health and Human Services ATP III  Classifications)  Optimal          <100 mg/dL  Near Optimal     100-129 mg/dL  Borderline High  130-159 mg/dL  High             160-189 mg/dL  Very High        >189 mg/dL      Triglycerides 01/04/2024 70  0 - 150 mg/dL Final    HDL Cholesterol 01/04/2024 72 (H)  40 - 60 mg/dL Final    VLDL Cholesterol Forrest 01/04/2024 13  5 - 40 mg/dL Final    LDL Chol Calc (NIH) 01/04/2024 95  0 - 100 mg/dL Final    Chol/HDL Ratio 01/04/2024 2.50   Final    Glucose 01/04/2024 92  65 - 99 mg/dL Final    BUN 01/04/2024 9  6 - 20 mg/dL Final    Creatinine 01/04/2024 0.83  0.57 - 1.00 mg/dL Final    EGFR Result 01/04/2024 87.6  >60.0 mL/min/1.73 Final    Comment: GFR Normal >60  Chronic Kidney Disease <60  Kidney Failure <15      BUN/Creatinine Ratio 01/04/2024 10.8  7.0 - 25.0 Final    Sodium 01/04/2024 139  136 - 145 mmol/L Final    Potassium 01/04/2024 4.6  3.5 - 5.2 mmol/L Final    Chloride 01/04/2024 106  98 - 107 mmol/L Final    Total CO2 01/04/2024 22.5  22.0 - 29.0 mmol/L Final    Calcium 01/04/2024 9.3  8.6 - 10.5 mg/dL Final    Total Protein 01/04/2024 7.2  6.0 - 8.5 g/dL Final    Albumin 01/04/2024 4.3  3.5 - 5.2 g/dL Final    Globulin 01/04/2024 2.9  gm/dL Final    A/G Ratio 01/04/2024 1.5  g/dL Final    Total Bilirubin 01/04/2024 0.4  0.0 - 1.2 mg/dL Final    Alkaline Phosphatase 01/04/2024 60  39 - 117 U/L Final    AST (SGOT) 01/04/2024 16  1 - 32 U/L Final    ALT (SGPT) 01/04/2024 13  1 - 33 U/L Final      Assessment & Plan    Diagnoses and all orders for this visit:    1. Health care maintenance (Primary)    2. Mild intermittent asthma, unspecified whether complicated  -     albuterol sulfate  (90 Base) MCG/ACT inhaler; Inhale 2 puffs Every 4 (Four) Hours As Needed for Wheezing.  Dispense: 18 g; Refill: 1                 1. Preventative counseling- continue healthy diet and exercise   2. Asthma- well controlled    Discussed vaccines.     UTD with GYN- she is considering genetic counseling.

## 2024-11-21 ENCOUNTER — OFFICE VISIT (OUTPATIENT)
Dept: INTERNAL MEDICINE | Facility: CLINIC | Age: 49
End: 2024-11-21
Payer: COMMERCIAL

## 2024-11-21 VITALS
WEIGHT: 168 LBS | DIASTOLIC BLOOD PRESSURE: 62 MMHG | HEIGHT: 64 IN | OXYGEN SATURATION: 98 % | BODY MASS INDEX: 28.68 KG/M2 | HEART RATE: 91 BPM | SYSTOLIC BLOOD PRESSURE: 110 MMHG

## 2024-11-21 DIAGNOSIS — M77.11 LATERAL EPICONDYLITIS OF RIGHT ELBOW: Primary | ICD-10-CM

## 2024-11-21 PROCEDURE — 99213 OFFICE O/P EST LOW 20 MIN: CPT | Performed by: NURSE PRACTITIONER

## 2024-11-21 NOTE — PROGRESS NOTES
Subjective   Krupa Palomo is a 49 y.o. female.     History of Present Illness    The patient is here today with c/o right elbow pain X 3- 4 months. Reports no injury. Started after increasing weight with strength training. Not getting better with rest. She has seen chiropractor with out relief. She has taken NSAIDs intermittently with out benefit. Describes discomfort and weakness sometimes all the way in to her hand.     The following portions of the patient's history were reviewed and updated as appropriate: allergies, current medications, past family history, past medical history, past social history, past surgical history and problem list.    Review of Systems   Constitutional:  Negative for chills and fever.   Respiratory: Negative.     Cardiovascular: Negative.    Musculoskeletal:  Positive for arthralgias. Negative for joint swelling.   Psychiatric/Behavioral:  Positive for stress. Negative for dysphoric mood and suicidal ideas. The patient is not nervous/anxious.        Objective   Physical Exam  Constitutional:       Appearance: Normal appearance. She is well-developed.   Neck:      Thyroid: No thyromegaly.   Cardiovascular:      Rate and Rhythm: Normal rate and regular rhythm.      Heart sounds: Normal heart sounds.   Pulmonary:      Effort: Pulmonary effort is normal.      Breath sounds: Normal breath sounds.   Musculoskeletal:      Cervical back: Normal range of motion and neck supple.   Lymphadenopathy:      Cervical: No cervical adenopathy.   Skin:     General: Skin is warm and dry.   Neurological:      Mental Status: She is alert.   Psychiatric:         Behavior: Behavior normal.         Thought Content: Thought content normal.         Judgment: Judgment normal.         Vitals:    11/21/24 0801   BP: 110/62   Pulse: 91   SpO2: 98%     Body mass index is 28.82 kg/m².    Current Outpatient Medications:     albuterol sulfate  (90 Base) MCG/ACT inhaler, Inhale 2 puffs Every 4 (Four) Hours As Needed  for Wheezing., Disp: 18 g, Rfl: 1    triamcinolone (KENALOG) 0.1 % cream, Apply 1 application  topically to the appropriate area as directed 2 (Two) Times a Day., Disp: 28.4 g, Rfl: 1     Assessment & Plan   Diagnoses and all orders for this visit:    1. Lateral epicondylitis of right elbow (Primary)  -     Ambulatory Referral to Physical Therapy for Evaluation & Treatment  -     Ambulatory Referral to Hand Surgery                 1. Lateral epicondylitis- suggest PT, and refer to hand specialist, OTC diclofenac, RICE    Discussed vaccines.

## 2024-12-02 ENCOUNTER — TREATMENT (OUTPATIENT)
Dept: PHYSICAL THERAPY | Facility: CLINIC | Age: 49
End: 2024-12-02
Payer: COMMERCIAL

## 2024-12-02 DIAGNOSIS — M77.11 RIGHT LATERAL EPICONDYLITIS: Primary | ICD-10-CM

## 2024-12-02 DIAGNOSIS — M25.521 RIGHT ELBOW PAIN: ICD-10-CM

## 2024-12-02 PROCEDURE — 97530 THERAPEUTIC ACTIVITIES: CPT | Performed by: PHYSICAL THERAPIST

## 2024-12-02 PROCEDURE — 97161 PT EVAL LOW COMPLEX 20 MIN: CPT | Performed by: PHYSICAL THERAPIST

## 2024-12-02 PROCEDURE — 97110 THERAPEUTIC EXERCISES: CPT | Performed by: PHYSICAL THERAPIST

## 2024-12-02 PROCEDURE — 97112 NEUROMUSCULAR REEDUCATION: CPT | Performed by: PHYSICAL THERAPIST

## 2024-12-02 NOTE — PROGRESS NOTES
Physical Therapy Initial Evaluation and Plan of Care  [address], [address]  [place] [address]    Patient: Krupa Palomo   : 1975  Diagnosis/ICD-10 Code:  Right lateral epicondylitis [M77.11]  Referring practitioner: PAUL Glover  Date of Initial Visit: 2024  Today's Date: 2024  Patient seen for 1 session         Visit Diagnoses:    ICD-10-CM ICD-9-CM   1. Right lateral epicondylitis  M77.11 726.32   2. Right elbow pain  M25.521 719.42         Subjective Questionnaire: QuickDASH: 17.5      Subjective Evaluation    History of Present Illness  Mechanism of injury: Patient is a 49 year old female who presents with right elbow pain that has been bothering her for about 4 months.  Reports no injury at this time, but does report increasing free weight lifting.  Reports using ice occasional since the onset.    Reports being able to do everything, but reports lifting hurts.  Reports being scared to hold certain things in the right hand due to not wanting to drop them.  Denies dropping anything to this point, but reports weakness.    Denies any previous surgeries or injuries.      Patient Occupation: Educator Pain  Current pain ratin  At worst pain ratin  Location: right forearm  Quality: dull ache (mild numbness in the right hand)  Relieving factors: rest  Aggravating factors: lifting and sleeping  Progression: worsening    Hand dominance: right           Objective          Palpation     Additional Palpation Details  TTP to the right lateral epicondyle and forearm extensor muscle belly.    Active Range of Motion   Cervical/Thoracic Spine   Cervical    Flexion: WFL  Extension: WFL  Left rotation: WFL  Right rotation: WFL  Left Shoulder   Normal active range of motion    Right Shoulder   Normal active range of motion    Right Elbow   Flexion: Right elbow active flexion: WNL.   Extension: Right elbow active extension: -5.   Forearm supination: Right elbow active supination:  WNL.   Forearm pronation: Right elbow active pronation: WNL.     Right Wrist   Wrist flexion: Right wrist active flexion: WNL.   Wrist extension: Right wrist active extension: WNL.   Radial deviation: Right wrist active radial deviation: WNL.   Ulnar deviation: Right wrist active ulnar deviation: WNL.     Strength/Myotome Testing     Right Shoulder     Planes of Motion   Flexion: 4+   Abduction: 4+   External rotation at 0°: 4   External rotation at 90°: 4   Internal rotation at 0°: 4+     Isolated Muscles   Supraspinatus: 4     Left Elbow   Extension: 4+    Right Elbow   Flexion: 4+  Extension: 4-    Left Wrist/Hand      (2nd hand position)     Trial 1: 70 lbs    Trial 2: 72 lbs    Trial 3: 67 lbs    Average: 69.67 lbs    Right Wrist/Hand   Wrist extension: 4  Wrist flexion: 4+  Radial deviation: 4  Ulnar deviation: 4+     (2nd hand position)     Trial 1: 60 lbs    Trial 2: 61 lbs    Trial 3: 60 lbs    Average: 60.33 lbs    Additional Strength Details  Pain with wrist ext and RD.    Tests     Right Elbow   Positive Maudsley's.     Additional Tests Details  + Taina          Assessment & Plan       Assessment  Impairments: activity intolerance, impaired physical strength, lacks appropriate home exercise program and pain with function   Assessment details: Patient is a 49 year old female who presents with c/o pain, TTP, decreased strength and positive special testing which is limiting her ability to perform activities.   Barriers to therapy: none  Prognosis: good  Prognosis details: STG's to be met by 2 weeks  1)  Independent with HEP to show compliance  2)  Decrease pain by 50% or more to allow patient to perform self care and activities more comfortably  3)  Min to no TTP present to indicate improved healing response  4)  Increase  strength on the right 5 lbs for improved ability to hold objects in the kitchen and during the day without feeling like she will drop them    LTG's to be met by 4  weeks  1)  Independent with HEP progression to show continued compliance  2)  Decrease pain by 75% or more to allow patient to return to activities and hobbies with minimal limitations   3)  Increase strength for the shoulder, elbow and wrist to 4+/5 or more for improved ability to hold, lift and carry objects  4)  Negative special testing  5)  No TTP present to indicate improved healing response  6)  Increase  strength on the right to equal to the left or greater for improved ability to hold objects        Plan  Therapy options: will be seen for skilled therapy services  Planned therapy interventions: strengthening, stretching, therapeutic activities, home exercise program, neuromuscular re-education and manual therapy  Frequency: 2x week  Duration in weeks: 4  Treatment plan discussed with: patient          Timed:         Manual Therapy:    0     mins  63280;     Therapeutic Exercise:    13     mins  45383;     Neuromuscular Sarah:    8    mins  58132;    Therapeutic Activity:     8     mins  21425;     Gait Trainin     mins  64851;     Ultrasound:     0     mins  54571;          Un-Timed:  Electrical Stimulation:    0     mins  58500 ( );    Low Eval     20     Mins  48601  Mod Eval     0     Mins  07396  High Eval                       0     Mins  35638        Timed Treatment:   29   mins   Total Treatment:     49   mins          PT: Clark Ramos PT     [place] License [id number]  Electronically signed by Clark Ramos PT, 24, 7:05 AM EST    Certification Period: 2024 thru 3/1/2025  I certify that the therapy services are furnished while this patient is under my care.  The services outlined above are required by this patient, and will be reviewed every 90 days.    Sharita Avalos, Aprn  [address] [place] [address]   NPI: [id number]      Clark Ramos PT   License number: [id number]        Physician  Signature:__________________________________________________    PHYSICIAN: Sharita Avalos APRN      DATE:     Please sign and return via fax to [email or web address] . Thank you, [facility].

## 2024-12-10 ENCOUNTER — TREATMENT (OUTPATIENT)
Dept: PHYSICAL THERAPY | Facility: CLINIC | Age: 49
End: 2024-12-10
Payer: COMMERCIAL

## 2024-12-10 DIAGNOSIS — M77.11 RIGHT LATERAL EPICONDYLITIS: Primary | ICD-10-CM

## 2024-12-10 DIAGNOSIS — M25.521 RIGHT ELBOW PAIN: ICD-10-CM

## 2024-12-10 PROCEDURE — 97112 NEUROMUSCULAR REEDUCATION: CPT | Performed by: PHYSICAL THERAPIST

## 2024-12-10 PROCEDURE — 97110 THERAPEUTIC EXERCISES: CPT | Performed by: PHYSICAL THERAPIST

## 2024-12-10 PROCEDURE — 97530 THERAPEUTIC ACTIVITIES: CPT | Performed by: PHYSICAL THERAPIST

## 2024-12-10 NOTE — PROGRESS NOTES
Physical Therapy Daily Treatment Note  [address], [address]  [place] [address]      Patient: Krupa Palomo   : 1975  Referring practitioner: PAUL Glover  Date of Initial Visit: Type: THERAPY  Noted: 2024  Today's Date: 12/10/2024  Patient seen for 2 sessions       Visit Diagnoses:    ICD-10-CM ICD-9-CM   1. Right lateral epicondylitis  M77.11 726.32   2. Right elbow pain  M25.521 719.42         Subjective   Patient reports that the elbow is the same, rates the pain at 3/10.  Reports not being able to use heat as much.    Objective   See Exercise, Manual, and Modality Logs for complete treatment.       Assessment/Plan  Subjective reports remain low.  Encouraged the patient to utilize heat 3 times a day.  Continued with the KT secondary to the patient noting an improvement with it on.  Added prone T's for scapular strengthening which will improve proximal stabilization.  Added radial nerve glide due to chronic pain in the elbow.  Patient performed the routine without an increase in pain.      Timed:         Manual Therapy:    0     mins  44643;     Therapeutic Exercise:    14     mins  63597;     Neuromuscular Sarah:    8    mins  03978;    Therapeutic Activity:     8     mins  53116;     Gait Training      0    mins  59224;  Work Conditioning     0   mins  68857       Untimed:  Electrical Stimulation:    0     mins  29777 ( );      Timed Treatment:   30   mins   Total Treatment:     30   mins    Clark Ramos, PT  [place] License: [id number]

## 2024-12-12 ENCOUNTER — TREATMENT (OUTPATIENT)
Dept: PHYSICAL THERAPY | Facility: CLINIC | Age: 49
End: 2024-12-12
Payer: COMMERCIAL

## 2024-12-12 DIAGNOSIS — M25.521 RIGHT ELBOW PAIN: ICD-10-CM

## 2024-12-12 DIAGNOSIS — M77.11 RIGHT LATERAL EPICONDYLITIS: Primary | ICD-10-CM

## 2024-12-12 NOTE — PROGRESS NOTES
Physical Therapy Daily Treatment Note  [address], [address]  [place] [address]      Patient: Krupa Palomo   : 1975  Referring practitioner: PAUL Glover  Date of Initial Visit: Type: THERAPY  Noted: 2024  Today's Date: 2024  Patient seen for 3 sessions       Visit Diagnoses:    ICD-10-CM ICD-9-CM   1. Right lateral epicondylitis  M77.11 726.32   2. Right elbow pain  M25.521 719.42         Subjective   Patient reports pain rated at 3/10.    Objective   See Exercise, Manual, and Modality Logs for complete treatment.       Assessment/Plan  Subjective reports remain the same as the previous visit.  Added muscle scraping to the right FA extensor musculature, which the patient tolerated without c/o, to help address the tightness and tenderness.  Patient noted that the FA felt really good after the scraping.  Patient performed the routine without an increase in pain.      Timed:         Manual Therapy:    8     mins  69081;     Therapeutic Exercise:    18     mins  99855;     Neuromuscular Sarah:    8    mins  91320;    Therapeutic Activity:     8     mins  00021;     Gait Training      0    mins  60257;  Work Conditioning     0   mins  85667       Untimed:  Electrical Stimulation:    0     mins  07133 ( );      Timed Treatment:   42   mins   Total Treatment:     42   mins    Clark Ramos, PT  [place] License: [id number]

## 2024-12-16 ENCOUNTER — TREATMENT (OUTPATIENT)
Dept: PHYSICAL THERAPY | Facility: CLINIC | Age: 49
End: 2024-12-16
Payer: COMMERCIAL

## 2024-12-16 DIAGNOSIS — M77.11 RIGHT LATERAL EPICONDYLITIS: Primary | ICD-10-CM

## 2024-12-16 DIAGNOSIS — M25.521 RIGHT ELBOW PAIN: ICD-10-CM

## 2024-12-16 PROCEDURE — 97110 THERAPEUTIC EXERCISES: CPT | Performed by: PHYSICAL THERAPIST

## 2024-12-16 PROCEDURE — 97140 MANUAL THERAPY 1/> REGIONS: CPT | Performed by: PHYSICAL THERAPIST

## 2024-12-16 PROCEDURE — 97530 THERAPEUTIC ACTIVITIES: CPT | Performed by: PHYSICAL THERAPIST

## 2024-12-16 NOTE — PROGRESS NOTES
Physical Therapy Daily Treatment Note  [address], [address]  [place] [address]      Patient: Krupa Palomo   : 1975  Referring practitioner: PAUL Glover  Date of Initial Visit: Type: THERAPY  Noted: 2024  Today's Date: 2024  Patient seen for 4 sessions       Visit Diagnoses:    ICD-10-CM ICD-9-CM   1. Right lateral epicondylitis  M77.11 726.32   2. Right elbow pain  M25.521 719.42           Subjective   Patient reports pain at rest rated at 1/10, moving around this morning it was 3-4/10.    Objective   See Exercise, Manual, and Modality Logs for complete treatment.       Assessment/Plan  Subjective reports remain low at rest.  Continued with the muscle scraping to help the symptoms in the forearm.  Held the KT today, but plan on continuing it at the next visit.  Patient tolerated the increase in the routine without a significant increase in pain.      Timed:         Manual Therapy:    8     mins  38217;     Therapeutic Exercise:    22     mins  72799;     Neuromuscular Sarah:    0    mins  12064;    Therapeutic Activity:     8     mins  36490;     Gait Training      0    mins  51287;  Work Conditioning     0   mins  99603       Untimed:  Electrical Stimulation:    0     mins  64201 ( );      Timed Treatment:   38   mins   Total Treatment:     38   mins    Clark Ramos, PT  [place] License: [id number]

## 2024-12-18 ENCOUNTER — TREATMENT (OUTPATIENT)
Dept: PHYSICAL THERAPY | Facility: CLINIC | Age: 49
End: 2024-12-18
Payer: COMMERCIAL

## 2024-12-18 DIAGNOSIS — M77.11 RIGHT LATERAL EPICONDYLITIS: Primary | ICD-10-CM

## 2024-12-18 DIAGNOSIS — M25.521 RIGHT ELBOW PAIN: ICD-10-CM

## 2024-12-18 PROCEDURE — 97110 THERAPEUTIC EXERCISES: CPT | Performed by: PHYSICAL THERAPIST

## 2024-12-18 PROCEDURE — 97112 NEUROMUSCULAR REEDUCATION: CPT | Performed by: PHYSICAL THERAPIST

## 2024-12-18 PROCEDURE — 97530 THERAPEUTIC ACTIVITIES: CPT | Performed by: PHYSICAL THERAPIST

## 2024-12-18 NOTE — PROGRESS NOTES
Physical Therapy Daily Treatment Note  [address] [facility] [address] [facility], [address]  [place] [address]      Patient: Krupa Palomo   : 1975  Referring practitioner: PAUL Glover  Date of Initial Visit: Type: THERAPY  Noted: 2024  Today's Date: 2024  Patient seen for 5 sessions       Visit Diagnoses:    ICD-10-CM ICD-9-CM   1. Right lateral epicondylitis  M77.11 726.32   2. Right elbow pain  M25.521 719.42           Subjective   Patient reports that the elbow is about the same.    Objective   See Exercise, Manual, and Modality Logs for complete treatment.       Assessment/Plan  Subjective reports remain unchanged.  Continued with the KT today, but if there is any skin irritation, we will D/C it.  Added wrist extension iso hold for strengthening which will allow patient to use the arm without pain or fatigue. Patient performed the routine without an increase in pain. Plan to gradually progress the strengthening exercises slowly.      Timed:         Manual Therapy:    0     mins  03227;     Therapeutic Exercise:    14     mins  38770;    Neuromuscular Sarah:    8    mins  98206;    Therapeutic Activity:     8     mins  74783;     Gait Training      0    mins  81575;  Work Conditioning     0   mins  31042       Untimed:  Electrical Stimulation:    0     mins  81736 ( );      Timed Treatment:   30   mins   Total Treatment:     30   mins    Clark Ramos, PT  [place] License: [id number]

## 2024-12-23 ENCOUNTER — TREATMENT (OUTPATIENT)
Dept: PHYSICAL THERAPY | Facility: CLINIC | Age: 49
End: 2024-12-23
Payer: COMMERCIAL

## 2024-12-23 DIAGNOSIS — M25.521 RIGHT ELBOW PAIN: ICD-10-CM

## 2024-12-23 DIAGNOSIS — M77.11 RIGHT LATERAL EPICONDYLITIS: Primary | ICD-10-CM

## 2024-12-23 NOTE — PROGRESS NOTES
Physical Therapy Daily Treatment Note  [address] [facility] [facility], [address]  [place] [address]      Patient: Krupa Paolmo   : 1975  Referring practitioner: PAUL Glover  Date of Initial Visit: Type: THERAPY  Noted: 2024  Today's Date: 2024  Patient seen for 6 sessions       Visit Diagnoses:    ICD-10-CM ICD-9-CM   1. Right lateral epicondylitis  M77.11 726.32   2. Right elbow pain  M25.521 719.42           Subjective   Patient reports that the elbow is a little better.    Objective   See Exercise, Manual, and Modality Logs for complete treatment.       Assessment/Plan  Subjective reports are gradually improving.  Function is improving as evident by less pain noted with some activities and being able to have her arm by side without as much pain.  Continued with the muscle scraping secondary to the patient noting improvements with it.  The exercise routine was not significantly progressed from the previous visit.  Patient performed the routine without an increase in pain.      Timed:         Manual Therapy:    8     mins  71918;     Therapeutic Exercise:    15     mins  11369;     Neuromuscular Sarah:    8    mins  00127;    Therapeutic Activity:     8     mins  05188;     Gait Training      0    mins  52204;  Work Conditioning     0   mins  64485       Untimed:  Electrical Stimulation:    0     mins  80232 ( );      Timed Treatment:   39   mins   Total Treatment:     39   mins    Clark Ramos, PT  [place] License: [id number]

## 2025-01-02 ENCOUNTER — TREATMENT (OUTPATIENT)
Dept: PHYSICAL THERAPY | Facility: CLINIC | Age: 50
End: 2025-01-02
Payer: COMMERCIAL

## 2025-01-02 DIAGNOSIS — M25.521 RIGHT ELBOW PAIN: ICD-10-CM

## 2025-01-02 DIAGNOSIS — M77.11 RIGHT LATERAL EPICONDYLITIS: Primary | ICD-10-CM

## 2025-01-02 NOTE — PROGRESS NOTES
Physical Therapy Daily Treatment Note  [address], [address]  [place] [address]      Patient: Krupa Palomo   : 1975  Referring practitioner: PAUL Glover  Date of Initial Visit: Type: THERAPY  Noted: 2024  Today's Date: 2025  Patient seen for 7 sessions       Visit Diagnoses:    ICD-10-CM ICD-9-CM   1. Right lateral epicondylitis  M77.11 726.32   2. Right elbow pain  M25.521 719.42           Subjective   Patient reports that the elbow is better overall.  Denies pain currently.    Objective   See Exercise, Manual, and Modality Logs for complete treatment.       Assessment/Plan  Subjective reports are improved with regard to pain.  Function is improving as evident by her ability to perform tasks around the house, such as taking down [employer] decor, without issues.  Continued with the muscle scraping and KT to help the symptoms.  Added wrist extension eccentric for strengthening which will allow patient to use the arm more with ADL'S.  Patient performed the exercise routine without a significant increase in pain.      Timed:         Manual Therapy:    8     mins  49704;     Therapeutic Exercise:    17     mins  29688;     Neuromuscular Sarah:    8    mins  42507;    Therapeutic Activity:     8     mins  58405;     Gait Training      0    mins  50434;  Work Conditioning     0   mins  95234       Untimed:  Electrical Stimulation:    0     mins  83292 ( );      Timed Treatment:   41   mins   Total Treatment:     41   mins    Clark Ramos, PT  [place] License: [id number]

## 2025-01-07 ENCOUNTER — LAB (OUTPATIENT)
Dept: INTERNAL MEDICINE | Facility: CLINIC | Age: 50
End: 2025-01-07
Payer: COMMERCIAL

## 2025-01-07 DIAGNOSIS — Z00.00 HEALTH CARE MAINTENANCE: Primary | ICD-10-CM

## 2025-01-08 LAB
ALBUMIN SERPL-MCNC: 3.8 G/DL (ref 3.5–5.2)
ALBUMIN/GLOB SERPL: 1.2 G/DL
ALP SERPL-CCNC: 60 U/L (ref 39–117)
ALT SERPL-CCNC: 12 U/L (ref 1–33)
AST SERPL-CCNC: 21 U/L (ref 1–32)
BASOPHILS # BLD AUTO: 0.04 10*3/MM3 (ref 0–0.2)
BASOPHILS NFR BLD AUTO: 0.5 % (ref 0–1.5)
BILIRUB SERPL-MCNC: 0.4 MG/DL (ref 0–1.2)
BUN SERPL-MCNC: 9 MG/DL (ref 6–20)
BUN/CREAT SERPL: 11.7 (ref 7–25)
CALCIUM SERPL-MCNC: 9.1 MG/DL (ref 8.6–10.5)
CHLORIDE SERPL-SCNC: 104 MMOL/L (ref 98–107)
CHOLEST SERPL-MCNC: 174 MG/DL (ref 0–200)
CHOLEST/HDLC SERPL: 2.29 {RATIO}
CO2 SERPL-SCNC: 24.3 MMOL/L (ref 22–29)
CREAT SERPL-MCNC: 0.77 MG/DL (ref 0.57–1)
EGFRCR SERPLBLD CKD-EPI 2021: 95.3 ML/MIN/1.73
EOSINOPHIL # BLD AUTO: 0.14 10*3/MM3 (ref 0–0.4)
EOSINOPHIL NFR BLD AUTO: 1.9 % (ref 0.3–6.2)
ERYTHROCYTE [DISTWIDTH] IN BLOOD BY AUTOMATED COUNT: 12.1 % (ref 12.3–15.4)
GLOBULIN SER CALC-MCNC: 3.2 GM/DL
GLUCOSE SERPL-MCNC: 91 MG/DL (ref 65–99)
HCT VFR BLD AUTO: 42.5 % (ref 34–46.6)
HDLC SERPL-MCNC: 76 MG/DL (ref 40–60)
HGB BLD-MCNC: 13.4 G/DL (ref 12–15.9)
IMM GRANULOCYTES # BLD AUTO: 0.02 10*3/MM3 (ref 0–0.05)
IMM GRANULOCYTES NFR BLD AUTO: 0.3 % (ref 0–0.5)
LDLC SERPL CALC-MCNC: 82 MG/DL (ref 0–100)
LYMPHOCYTES # BLD AUTO: 1.12 10*3/MM3 (ref 0.7–3.1)
LYMPHOCYTES NFR BLD AUTO: 15.3 % (ref 19.6–45.3)
MCH RBC QN AUTO: 29 PG (ref 26.6–33)
MCHC RBC AUTO-ENTMCNC: 31.5 G/DL (ref 31.5–35.7)
MCV RBC AUTO: 92 FL (ref 79–97)
MONOCYTES # BLD AUTO: 0.68 10*3/MM3 (ref 0.1–0.9)
MONOCYTES NFR BLD AUTO: 9.3 % (ref 5–12)
NEUTROPHILS # BLD AUTO: 5.3 10*3/MM3 (ref 1.7–7)
NEUTROPHILS NFR BLD AUTO: 72.7 % (ref 42.7–76)
NRBC BLD AUTO-RTO: 0 /100 WBC (ref 0–0.2)
PLATELET # BLD AUTO: 199 10*3/MM3 (ref 140–450)
POTASSIUM SERPL-SCNC: 4.2 MMOL/L (ref 3.5–5.2)
PROT SERPL-MCNC: 7 G/DL (ref 6–8.5)
RBC # BLD AUTO: 4.62 10*6/MM3 (ref 3.77–5.28)
SODIUM SERPL-SCNC: 138 MMOL/L (ref 136–145)
TRIGL SERPL-MCNC: 86 MG/DL (ref 0–150)
TSH SERPL DL<=0.005 MIU/L-ACNC: 2.4 UIU/ML (ref 0.27–4.2)
VLDLC SERPL CALC-MCNC: 16 MG/DL (ref 5–40)
WBC # BLD AUTO: 7.3 10*3/MM3 (ref 3.4–10.8)

## 2025-01-14 ENCOUNTER — OFFICE VISIT (OUTPATIENT)
Dept: INTERNAL MEDICINE | Facility: CLINIC | Age: 50
End: 2025-01-14
Payer: COMMERCIAL

## 2025-01-14 ENCOUNTER — TREATMENT (OUTPATIENT)
Dept: PHYSICAL THERAPY | Facility: CLINIC | Age: 50
End: 2025-01-14
Payer: COMMERCIAL

## 2025-01-14 VITALS
WEIGHT: 169 LBS | OXYGEN SATURATION: 99 % | SYSTOLIC BLOOD PRESSURE: 118 MMHG | BODY MASS INDEX: 28.85 KG/M2 | HEART RATE: 73 BPM | DIASTOLIC BLOOD PRESSURE: 80 MMHG | HEIGHT: 64 IN

## 2025-01-14 DIAGNOSIS — M25.521 RIGHT ELBOW PAIN: ICD-10-CM

## 2025-01-14 DIAGNOSIS — M77.11 RIGHT LATERAL EPICONDYLITIS: Primary | ICD-10-CM

## 2025-01-14 DIAGNOSIS — J45.20 MILD INTERMITTENT ASTHMA, UNSPECIFIED WHETHER COMPLICATED: ICD-10-CM

## 2025-01-14 DIAGNOSIS — Z00.00 HEALTH CARE MAINTENANCE: Primary | ICD-10-CM

## 2025-01-14 PROCEDURE — 97110 THERAPEUTIC EXERCISES: CPT | Performed by: PHYSICAL THERAPIST

## 2025-01-14 PROCEDURE — 97112 NEUROMUSCULAR REEDUCATION: CPT | Performed by: PHYSICAL THERAPIST

## 2025-01-14 PROCEDURE — 99396 PREV VISIT EST AGE 40-64: CPT | Performed by: NURSE PRACTITIONER

## 2025-01-14 PROCEDURE — 97140 MANUAL THERAPY 1/> REGIONS: CPT | Performed by: PHYSICAL THERAPIST

## 2025-01-14 PROCEDURE — 97530 THERAPEUTIC ACTIVITIES: CPT | Performed by: PHYSICAL THERAPIST

## 2025-01-14 RX ORDER — ALBUTEROL SULFATE 90 UG/1
2 INHALANT RESPIRATORY (INHALATION) EVERY 4 HOURS PRN
Qty: 18 G | Refills: 1 | Status: SHIPPED | OUTPATIENT
Start: 2025-01-14

## 2025-01-14 RX ORDER — TRIAMCINOLONE ACETONIDE 1 MG/G
1 CREAM TOPICAL 2 TIMES DAILY
Qty: 28.4 G | Refills: 1 | Status: SHIPPED | OUTPATIENT
Start: 2025-01-14

## 2025-01-14 NOTE — PROGRESS NOTES
Subjective   Krupa Palomo is a 49 y.o. female.     History of Present Illness   The patient is here today for CPE and lab work F/U. Feeling well. She is eating healthy and exercising.     Asthma- rare flares  Heart murmur- dx in early 20s, tricuspid valve regurg, no issues, had an ECHO   G-2 P-2   Pre-eclampsia with first pregnancy.       , dtrs: 19 and 14, 2 foster dtrs, 18 and 14. They are planning on adopting, the 14 yr old, 18 yr old was adopted.    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   Mom's pancreatic cancer is back, had it originally 5 yrs ago.   The following portions of the patient's history were reviewed and updated as appropriate: allergies, current medications, past family history, past medical history, past social history, past surgical history and problem list.    Review of Systems   Constitutional:  Negative for chills, fatigue and fever.   HENT:  Negative for ear pain, rhinorrhea and sore throat.    Eyes: Negative.    Respiratory:  Negative for cough and shortness of breath.    Cardiovascular: Negative.  Positive for palpitations (occ, typically triggered by caffeine).   Gastrointestinal: Negative.    Endocrine: Negative for cold intolerance and heat intolerance.   Genitourinary:  Negative for breast discharge, breast lump, breast pain, difficulty urinating, dyspareunia, dysuria, flank pain, frequency, genital sores, hematuria, pelvic pain and urgency.   Musculoskeletal: Negative.    Skin: Negative.    Allergic/Immunologic: Negative for environmental allergies and food allergies.   Neurological: Negative.    Hematological: Negative.    Psychiatric/Behavioral:  Negative for dysphoric mood and suicidal ideas. The patient is not nervous/anxious.        Objective   Physical Exam  Constitutional:       Appearance: Normal appearance. She is well-developed.      Comments: Body mass index is 28.99 kg/m².     HENT:      Right Ear: Hearing, tympanic membrane, ear canal and external ear normal.      Left Ear:  Hearing, tympanic membrane, ear canal and external ear normal.   Eyes:      General: Lids are normal.      Conjunctiva/sclera: Conjunctivae normal.      Pupils: Pupils are equal, round, and reactive to light.   Neck:      Thyroid: No thyroid mass or thyromegaly.      Vascular: No carotid bruit.      Trachea: Trachea normal.   Cardiovascular:      Rate and Rhythm: Normal rate and regular rhythm.      Pulses: Normal pulses.      Heart sounds: Normal heart sounds.   Pulmonary:      Effort: Pulmonary effort is normal.      Breath sounds: Normal breath sounds.   Abdominal:      General: Bowel sounds are normal.      Palpations: Abdomen is soft.      Tenderness: There is no abdominal tenderness.   Musculoskeletal:         General: Normal range of motion.      Cervical back: Normal range of motion.   Lymphadenopathy:      Cervical: No cervical adenopathy.      Upper Body:      Right upper body: No supraclavicular adenopathy.      Left upper body: No supraclavicular adenopathy.      Lower Body: No right inguinal adenopathy. No left inguinal adenopathy.   Skin:     General: Skin is warm and dry.   Neurological:      Mental Status: She is alert and oriented to person, place, and time.      GCS: GCS eye subscore is 4. GCS verbal subscore is 5. GCS motor subscore is 6.      Cranial Nerves: No cranial nerve deficit.      Sensory: No sensory deficit.      Deep Tendon Reflexes:      Reflex Scores:       Patellar reflexes are 2+ on the right side and 2+ on the left side.  Psychiatric:         Speech: Speech normal.         Behavior: Behavior normal. Behavior is cooperative.         Thought Content: Thought content normal.         Judgment: Judgment normal.         Vitals:    01/14/25 0822   BP: 118/80   Pulse: 73   SpO2: 99%     Body mass index is 28.99 kg/m².      Current Outpatient Medications:     albuterol sulfate  (90 Base) MCG/ACT inhaler, Inhale 2 puffs Every 4 (Four) Hours As Needed for Wheezing., Disp: 18 g, Rfl:  1    triamcinolone (KENALOG) 0.1 % cream, Apply 1 Application topically to the appropriate area as directed 2 (Two) Times a Day., Disp: 28.4 g, Rfl: 1   Assessment & Plan   Diagnoses and all orders for this visit:    1. Health care maintenance (Primary)    2. Mild intermittent asthma, unspecified whether complicated  -     albuterol sulfate  (90 Base) MCG/ACT inhaler; Inhale 2 puffs Every 4 (Four) Hours As Needed for Wheezing.  Dispense: 18 g; Refill: 1    Other orders  -     triamcinolone (KENALOG) 0.1 % cream; Apply 1 Application topically to the appropriate area as directed 2 (Two) Times a Day.  Dispense: 28.4 g; Refill: 1        Lab on 01/07/2025   Component Date Value Ref Range Status    Glucose 01/08/2025 91  65 - 99 mg/dL Final    BUN 01/08/2025 9  6 - 20 mg/dL Final    Creatinine 01/08/2025 0.77  0.57 - 1.00 mg/dL Final    EGFR Result 01/08/2025 95.3  >60.0 mL/min/1.73 Final    Comment: GFR Categories in Chronic Kidney Disease (CKD)/X09/  /X09/  GFR Category          GFR (mL/min/1.73)    Interpretation  G1/X09/                    90 or greater/X09/        Normal or high  (1)  G2//                    60-89                Mild decrease  (1)  G3a                   45-59                Mild to moderate  decrease  G3b                   30-44                Moderate to  severe decrease  G4                    15-29                Severe decrease  G5                    14 or less           Kidney failure//  /[id number]/  (1)In the absence of evidence of kidney disease, neither  GFR category G1 or G2 fulfill the criteria for CKD.  eGFR calculation 2021 CKD-EPI creatinine equation, which  does not include race as a factor      BUN/Creatinine Ratio 01/08/2025 11.7  7.0 - 25.0 Final    Sodium 01/08/2025 138  136 - 145 mmol/L Final    Potassium 01/08/2025 4.2  3.5 - 5.2 mmol/L Final    Chloride 01/08/2025 104  98 - 107 mmol/L Final    Total CO2 01/08/2025 24.3  22.0 - 29.0 mmol/L Final    Calcium  01/08/2025 9.1  8.6 - 10.5 mg/dL Final    Total Protein 01/08/2025 7.0  6.0 - 8.5 g/dL Final    Albumin 01/08/2025 3.8  3.5 - 5.2 g/dL Final    Globulin 01/08/2025 3.2  gm/dL Final    A/G Ratio 01/08/2025 1.2  g/dL Final    Total Bilirubin 01/08/2025 0.4  0.0 - 1.2 mg/dL Final    Alkaline Phosphatase 01/08/2025 60  39 - 117 U/L Final    AST (SGOT) 01/08/2025 21  1 - 32 U/L Final    ALT (SGPT) 01/08/2025 12  1 - 33 U/L Final    WBC 01/08/2025 7.30  3.40 - 10.80 10*3/mm3 Final    RBC 01/08/2025 4.62  3.77 - 5.28 10*6/mm3 Final    Hemoglobin 01/08/2025 13.4  12.0 - 15.9 g/dL Final    Hematocrit 01/08/2025 42.5  34.0 - 46.6 % Final    MCV 01/08/2025 92.0  79.0 - 97.0 fL Final    MCH 01/08/2025 29.0  26.6 - 33.0 pg Final    MCHC 01/08/2025 31.5  31.5 - 35.7 g/dL Final    RDW 01/08/2025 12.1 (L)  12.3 - 15.4 % Final    Platelets 01/08/2025 199  140 - 450 10*3/mm3 Final    Neutrophil Rel % 01/08/2025 72.7  42.7 - 76.0 % Final    Lymphocyte Rel % 01/08/2025 15.3 (L)  19.6 - 45.3 % Final    Monocyte Rel % 01/08/2025 9.3  5.0 - 12.0 % Final    Eosinophil Rel % 01/08/2025 1.9  0.3 - 6.2 % Final    Basophil Rel % 01/08/2025 0.5  0.0 - 1.5 % Final    Neutrophils Absolute 01/08/2025 5.30  1.70 - 7.00 10*3/mm3 Final    Lymphocytes Absolute 01/08/2025 1.12  0.70 - 3.10 10*3/mm3 Final    Monocytes Absolute 01/08/2025 0.68  0.10 - 0.90 10*3/mm3 Final    Eosinophils Absolute 01/08/2025 0.14  0.00 - 0.40 10*3/mm3 Final    Basophils Absolute 01/08/2025 0.04  0.00 - 0.20 10*3/mm3 Final    Immature Granulocyte Rel % 01/08/2025 0.3  0.0 - 0.5 % Final    Immature Grans Absolute 01/08/2025 0.02  0.00 - 0.05 10*3/mm3 Final    nRBC 01/08/2025 0.0  0.0 - 0.2 /100 WBC Final    Total Cholesterol 01/08/2025 174  0 - 200 mg/dL Final    Comment: Cholesterol Reference Ranges  (U.S. Department of Health and Human Services ATP III  Classifications)  Desirable          <200 mg/dL  Borderline High    200-239 mg/dL  High Risk          >240  mg/dL  Triglyceride Reference Ranges  (U.S. Department of Health and Human Services ATP III  Classifications)  Normal           <150 mg/dL  Borderline High  150-199 mg/dL  High             200-499 mg/dL  Very High        >500 mg/dL  HDL Reference Ranges  (U.S. Department of Health and Human Services ATP III  Classifications)  Low     <40 mg/dl (major risk factor for CHD)  High    >60 mg/dl ('negative' risk factor for CHD)  LDL Reference Ranges  (U.S. Department of Health and Human Services ATP III  Classifications)  Optimal          <100 mg/dL  Near Optimal     100-129 mg/dL  Borderline High  130-159 mg/dL  High             160-189 mg/dL  Very High        >189 mg/dL      Triglycerides 01/08/2025 86  0 - 150 mg/dL Final    HDL Cholesterol 01/08/2025 76 (H)  40 - 60 mg/dL Final    VLDL Cholesterol Forrest 01/08/2025 16  5 - 40 mg/dL Final    LDL Chol Calc (NIH) 01/08/2025 82  0 - 100 mg/dL Final    Chol/HDL Ratio 01/08/2025 2.29   Final    TSH 01/08/2025 2.400  0.270 - 4.200 uIU/mL Final             1. Preventative counseling- continue to eat healthy and exercise   2. Asthma- well controlled, albuterol refilled    Foster care paperwork completed today   Discussed vaccines.   GYN-

## 2025-01-14 NOTE — PROGRESS NOTES
Physical Therapy Daily Treatment Note  [address] [facility] [facility] [address], [address]  [place] [address]      Patient: Krupa Palomo   : 1975  Referring practitioner: PAUL Glover  Date of Initial Visit: Type: THERAPY  Noted: 2024  Today's Date: 2025  Patient seen for 8 sessions       Visit Diagnoses:    ICD-10-CM ICD-9-CM   1. Right lateral epicondylitis  M77.11 726.32   2. Right elbow pain  M25.521 719.42           Subjective   Patient reports that the elbow hasn't given her much pain for the last couple weeks, but reports soreness in the right forearm due to shoveling snow.  Reports no issues while shoveling.    Objective          Strength/Myotome Testing     Right Wrist/Hand   Wrist extension: 4+    Tests     Additional Tests Details  - Finger Extension MMT  - Marcelo for right elbow pain      See Exercise, Manual, and Modality Logs for complete treatment.       Assessment/Plan  Subjective reports are improved with regard to the elbow pain.  Function is improving as evident by her ability to shovel snow without pain, but had increased soreness after.  Continued with the KT to help the symptoms. Right wrist extension MMT is improved from the initial visit and patient now has negative special testing for the lateral epicondyle.  Added wrist extensor stretch due to increased soreness in the FA.  Added prone Y's for proximal stability.  Patient performed the routine without significant pain in the elbow or FA.      Timed:         Manual Therapy:    8     mins  58754;     Therapeutic Exercise:    15     mins  62905;    Neuromuscular Sarah:    8    mins  65203;    Therapeutic Activity:     9     mins  69394;     Gait Training      0    mins  08457;  Work Conditioning     0   mins  37890       Untimed:  Electrical Stimulation:    0     mins  21560 ( );      Timed Treatment:   40   mins   Total Treatment:     40   mins    Clark Ramos, PT  [place] License: [id number]

## 2025-01-20 ENCOUNTER — TREATMENT (OUTPATIENT)
Dept: PHYSICAL THERAPY | Facility: CLINIC | Age: 50
End: 2025-01-20
Payer: COMMERCIAL

## 2025-01-20 DIAGNOSIS — M77.11 RIGHT LATERAL EPICONDYLITIS: Primary | ICD-10-CM

## 2025-01-20 DIAGNOSIS — M25.521 RIGHT ELBOW PAIN: ICD-10-CM

## 2025-01-20 PROCEDURE — 97112 NEUROMUSCULAR REEDUCATION: CPT | Performed by: PHYSICAL THERAPIST

## 2025-01-20 PROCEDURE — 97530 THERAPEUTIC ACTIVITIES: CPT | Performed by: PHYSICAL THERAPIST

## 2025-01-20 PROCEDURE — 97140 MANUAL THERAPY 1/> REGIONS: CPT | Performed by: PHYSICAL THERAPIST

## 2025-01-20 PROCEDURE — 97110 THERAPEUTIC EXERCISES: CPT | Performed by: PHYSICAL THERAPIST

## 2025-01-20 NOTE — PROGRESS NOTES
Physical Therapy Daily Treatment Note  [address], [address]  [place] [address]      Patient: Krupa Palomo   : 1975  Referring practitioner: PAUL Glover  Date of Initial Visit: Type: THERAPY  Noted: 2024  Today's Date: 2025  Patient seen for 9 sessions       Visit Diagnoses:    ICD-10-CM ICD-9-CM   1. Right lateral epicondylitis  M77.11 726.32   2. Right elbow pain  M25.521 719.42           Subjective   Patient reports that the elbow is good, reports a tiny bit of pain this week. Continues to report soreness in the FA.    Objective   See Exercise, Manual, and Modality Logs for complete treatment.       Assessment/Plan  Subjective reports are improved since beginning PT, but remain about the same as the last visit.  Continued with the scraping and KT to help the symptoms.  Added wrist RD eccentric for strengthening which will improve patient's ability to use the right UE with ADL'S.  Patient performed the routine without visual or verbal signs of pain in the elbow.  Patient plans on getting an appt with the specialist and will continue as directed.      Timed:         Manual Therapy:    8     mins  86984;     Therapeutic Exercise:    20     mins  40613;     Neuromuscular Sarah:    8    mins  64542;    Therapeutic Activity:     8     mins  42589;     Gait Training      0    mins  35187;  Work Conditioning     0   mins  67126       Untimed:  Electrical Stimulation:    0     mins  06186 ( );      Timed Treatment:   44   mins   Total Treatment:     44   mins    Clark Ramos, PT  [place] License: [id number]

## 2025-02-13 ENCOUNTER — DOCUMENTATION (OUTPATIENT)
Dept: PHYSICAL THERAPY | Facility: CLINIC | Age: 50
End: 2025-02-13
Payer: COMMERCIAL

## 2025-03-20 NOTE — PROGRESS NOTES
Subjective   Krupa Palomo is a 48 y.o. female. Patient is here today for   Chief Complaint   Patient presents with    Bronchitis     Patient has history of bronchitis and asthma and has been wheezing and coughing for 3 weeks.    .    History of Present Illness   C/o cough x 3 weeks associated with wheezing. She has taken Mucinex and albuterol as needed. It helps her symptoms some. Chest feels tight.   No fever.     The following portions of the patient's history were reviewed and updated as appropriate: allergies, current medications, past family history, past medical history, past social history, past surgical history and problem list.    Review of Systems    Objective   Vitals:    10/09/23 1455   BP: 108/80   Pulse: 71   Temp: 98 øF (36.7 øC)   SpO2: 99%     Body mass index is 27.79 kg/mý.  Physical Exam  Vitals and nursing note reviewed.   Constitutional:       Appearance: Normal appearance. She is well-developed.   HENT:      Right Ear: Tympanic membrane and ear canal normal.      Left Ear: Tympanic membrane and ear canal normal.      Mouth/Throat:      Pharynx: Oropharynx is clear.   Cardiovascular:      Rate and Rhythm: Normal rate and regular rhythm.      Heart sounds: Normal heart sounds.   Pulmonary:      Effort: Pulmonary effort is normal.      Breath sounds: Wheezing present.   Skin:     General: Skin is warm and dry.   Neurological:      Mental Status: She is alert and oriented to person, place, and time.   Psychiatric:         Speech: Speech normal.         Behavior: Behavior normal.         Thought Content: Thought content normal.         Assessment & Plan   Diagnoses and all orders for this visit:    1. Bronchitis (Primary)  -     azithromycin (Zithromax Z-Naga) 250 MG tablet; Take 2 tablets by mouth on day 1, then 1 tablet daily on days 2-5  Dispense: 6 tablet; Refill: 0      Will treat with zithromax. Patient will use albuterol as needed. F/u if symptoms do not improve.            
NULL

## 2025-05-19 ENCOUNTER — OFFICE VISIT (OUTPATIENT)
Dept: INTERNAL MEDICINE | Facility: CLINIC | Age: 50
End: 2025-05-19
Payer: COMMERCIAL

## 2025-05-19 VITALS
BODY MASS INDEX: 29.02 KG/M2 | DIASTOLIC BLOOD PRESSURE: 70 MMHG | HEART RATE: 90 BPM | OXYGEN SATURATION: 99 % | HEIGHT: 64 IN | SYSTOLIC BLOOD PRESSURE: 108 MMHG | WEIGHT: 170 LBS

## 2025-05-19 DIAGNOSIS — K40.91 UNILATERAL RECURRENT INGUINAL HERNIA WITHOUT OBSTRUCTION OR GANGRENE: Primary | ICD-10-CM

## 2025-05-19 PROCEDURE — 99214 OFFICE O/P EST MOD 30 MIN: CPT | Performed by: NURSE PRACTITIONER

## 2025-05-19 NOTE — PROGRESS NOTES
Subjective   Krupa Palomo is a 49 y.o. female.     History of Present Illness    The patient is here today with c/o tight painful left groin. Started 3 weeks ago. At times will ache at night. Hx of inguinal hernia and repair about 9 yrs ago. No known injury but has increased lifting and squats at the gym.     The following portions of the patient's history were reviewed and updated as appropriate: allergies, current medications, past family history, past medical history, past social history, past surgical history and problem list.    Review of Systems   Constitutional: Negative.    Respiratory: Negative.     Cardiovascular: Negative.    Genitourinary: Negative.        Objective   Physical Exam  Constitutional:       Appearance: Normal appearance. She is well-developed.   Neck:      Thyroid: No thyromegaly.   Cardiovascular:      Rate and Rhythm: Normal rate and regular rhythm.      Heart sounds: Normal heart sounds.   Pulmonary:      Effort: Pulmonary effort is normal.      Breath sounds: Normal breath sounds.   Musculoskeletal:      Cervical back: Normal range of motion and neck supple.   Lymphadenopathy:      Cervical: No cervical adenopathy.   Skin:     General: Skin is warm and dry.   Neurological:      Mental Status: She is alert.   Psychiatric:         Behavior: Behavior normal.         Thought Content: Thought content normal.         Judgment: Judgment normal.         Vitals:    05/19/25 1056   BP: 108/70   Pulse: 90   SpO2: 99%     Body mass index is 29.17 kg/m².      Current Outpatient Medications:     albuterol sulfate  (90 Base) MCG/ACT inhaler, Inhale 2 puffs Every 4 (Four) Hours As Needed for Wheezing., Disp: 18 g, Rfl: 1    triamcinolone (KENALOG) 0.1 % cream, Apply 1 Application topically to the appropriate area as directed 2 (Two) Times a Day., Disp: 28.4 g, Rfl: 1   Assessment & Plan   Diagnoses and all orders for this visit:    1. Unilateral recurrent inguinal hernia without obstruction or  gangrene (Primary)  -     US Nonvascular Extremity Limited; Future                 1. Left inguinal pain/hernia- no outward signs of hernia today other than tenderness to palpation, concern as she has already had a repair X 1   Check US   Try aleve with food BID for a few weeks, back off on lifting

## 2025-05-30 ENCOUNTER — HOSPITAL ENCOUNTER (OUTPATIENT)
Dept: ULTRASOUND IMAGING | Facility: HOSPITAL | Age: 50
Discharge: HOME | End: 2025-05-30
Admitting: NURSE PRACTITIONER
Payer: COMMERCIAL

## 2025-05-30 DIAGNOSIS — K40.91 UNILATERAL RECURRENT INGUINAL HERNIA WITHOUT OBSTRUCTION OR GANGRENE: ICD-10-CM

## 2025-05-30 PROCEDURE — 76882 US LMTD JT/FCL EVL NVASC XTR: CPT
